# Patient Record
Sex: FEMALE | Race: WHITE | NOT HISPANIC OR LATINO | Employment: UNEMPLOYED | RURAL
[De-identification: names, ages, dates, MRNs, and addresses within clinical notes are randomized per-mention and may not be internally consistent; named-entity substitution may affect disease eponyms.]

---

## 2021-04-13 PROBLEM — M48.02 CERVICAL STENOSIS OF SPINE: Status: ACTIVE | Noted: 2020-11-11

## 2022-06-14 PROBLEM — E66.812 CLASS 2 OBESITY DUE TO EXCESS CALORIES WITHOUT SERIOUS COMORBIDITY WITH BODY MASS INDEX (BMI) OF 37.0 TO 37.9 IN ADULT: Status: ACTIVE | Noted: 2022-06-14

## 2022-12-23 ENCOUNTER — TELEPHONE (OUTPATIENT)
Dept: FAMILY MEDICINE CLINIC | Facility: CLINIC | Age: 37
End: 2022-12-23

## 2022-12-23 NOTE — TELEPHONE ENCOUNTER
Pain Management was denied, note is in the comments of the referral. Please advice on what to do next.

## 2023-03-29 ENCOUNTER — TELEPHONE (OUTPATIENT)
Dept: FAMILY MEDICINE CLINIC | Facility: CLINIC | Age: 38
End: 2023-03-29

## 2023-03-29 NOTE — TELEPHONE ENCOUNTER
Caller: Carie Judge    Relationship: Self    Best call back number: [phone]    What specialty or service is being requested: PAIN MANAGEMENT, PSYCHIATRY, AND SLEEP MEDICINE     Any additional details: PATIENT HAS NOT HEARD ANYTHING REGARDING THESE REFERRALS AND IS WONDERING IF THERE WAS AN ISSUE. PATIENT IS REQUESTING THE REFERRALS BE AUTHORIZED ASAP.    PLEASE CALL PATIENT WHEN REFERRALS ARE AUTHORIZED

## 2023-04-25 ENCOUNTER — TELEPHONE (OUTPATIENT)
Dept: FAMILY MEDICINE CLINIC | Facility: CLINIC | Age: 38
End: 2023-04-25

## 2023-04-25 NOTE — TELEPHONE ENCOUNTER
Caller: Carie Judge    Relationship: Self    Best call back number: [phone]    What is the best time to reach you: ANY    Who are you requesting to speak with (clinical staff, provider,  specific staff member): CLINICAL    Do you know the name of the person who called: PATIENT    What was the call regarding: PATIENT RETURNING CALL ABOUT REFERRALS.  PATIENT TOLD TO DIAL EXT 1029 BUT IVR NEVER GAVE HER THE OPTION TO DIAL AN EXTENSION.    Do you require a callback: YES

## 2023-06-01 PROBLEM — Z31.69 PRE-CONCEPTION COUNSELING: Status: ACTIVE | Noted: 2023-06-01

## 2023-06-02 PROBLEM — Z71.6 ENCOUNTER FOR TOBACCO USE CESSATION COUNSELING: Status: ACTIVE | Noted: 2023-06-02

## 2023-08-13 PROBLEM — F32.A DEPRESSION: Status: ACTIVE | Noted: 2023-08-13

## 2023-09-16 DIAGNOSIS — F39 MOOD DISORDER: ICD-10-CM

## 2023-09-16 DIAGNOSIS — F32.A DEPRESSION, UNSPECIFIED DEPRESSION TYPE: ICD-10-CM

## 2023-09-25 DIAGNOSIS — N39.3 SUI (STRESS URINARY INCONTINENCE, FEMALE): ICD-10-CM

## 2023-09-25 DIAGNOSIS — F39 MOOD DISORDER: ICD-10-CM

## 2023-09-25 DIAGNOSIS — F32.A DEPRESSION, UNSPECIFIED DEPRESSION TYPE: ICD-10-CM

## 2023-09-25 RX ORDER — OXYBUTYNIN CHLORIDE 5 MG/1
5 TABLET, EXTENDED RELEASE ORAL DAILY
Qty: 30 TABLET | Refills: 2 | Status: SHIPPED | OUTPATIENT
Start: 2023-09-25

## 2023-09-25 NOTE — TELEPHONE ENCOUNTER
Caller: Carie Troy    Relationship: Self    Best call back number:[phone]     Requested Prescriptions:   Requested Prescriptions     Pending Prescriptions Disp Refills    oxybutynin XL (DITROPAN-XL) 5 MG 24 hr tablet 30 tablet 2     Sig: Take 1 tablet by mouth Daily.        Pharmacy where request should be sent: [facility] [address] [place] [address] [address] - [phone]  - [phone]     Last office visit with prescribing clinician: 8/11/2023   Last telemedicine visit with prescribing clinician: Visit date not found   Next office visit with prescribing clinician: 10/10/2023     Additional details provided by patient: PATIENT HAS ENOUGH FOR THIS WEEK    Does the patient have less than a 3 day supply:  [] Yes  [x] No    Would you like a call back once the refill request has been completed: [] Yes [x] No    If the office needs to give you a call back, can they leave a voicemail: [] Yes [x] No    Viky Arias Rep   09/25/23 10:50 EDT

## 2023-10-05 NOTE — PROGRESS NOTES
Office Note          Name: Carie Troy    : 1985     MRN: [id number]     Chief Complaint  Annual Exam    Subjective     Carie Troy is a 37 y.o. female here for her annual wellness and physical exam.  The patient presents today for an annual wellness and physical examination.     Annual wellness  Overall, she feels healthy.   Libido is fair.   Not taking any birth control.   Still amiable to pregnancy.   Does not perform self-breast examinations.   Trying to eat healthily.   Does not exercise.   Smokes half of 1 pack of cigarettes daily.   Denies marijuana use or any other drugs.    She denies any difficulty swallowing.     OBGYN  She has not made an appointment for her PAP smear yet.   No concerns for discharge or nipple issues.     Depression  Well controlled on sertraline.  Needs a refill for sertraline.   She is happy with this medication.     Allergies  Well controlled with Zyrtec.   Needs a refill for Zyrtec.     Stress incontinence  Well controlled with Ditropan.  She needs a refill on Ditropan.     Gastrointestinal issue  Watery stool minutes after eating.  Has occasional stomach pain.   She has tried Tums and Pepcid.   Bowel movements intermittently diarrhea and solid.   Denies constipation.     Medications   No longer taking Atarax, Phenergan, or using Nasacort.  Has not been taking folic acid or prenatal vitamin.    She needs refills on her antihypertensive medication.     Preventative maintenance  She has never had a colonoscopy.   She has not had a mammogram or bone density scan.   Agreeable to tetanus and influenza vaccine today.   [facility] for annual eye care; she wears contact lenses.   Goes to [facility] for [facility] care.     The following portions of the patient's history were reviewed and   updated as appropriate: allergies, current medications, past family history, past medical history, past social history, past surgical history, and problem  "list.    Compared to one year ago, the patient feels [his / her / their] physical   health is [better / worse / same / same].    Compared to one year ago, the patient feels [his / her / their] mental   health is [better / worse / same / same].    Preventive Health/Lifestyle:     General Health: good  Nutrition: in general, a \"healthy\" diet    Activity level is never.   Exercises 0 per week.  Energy level is fair.  Sleep: poorly  Hours of Sleep: 9  Sleep Apnea or Snoring: Yes  Skin Self Exam: occasionally  Monthly Breast Self Exam: Performs monthly self breast exam  Libido: fair  Contraceptive History: Patient reports  is wanting to get pregnant.  They are not using birth control.     Last Physical Exam: 3+ years ago      Last tetanus shot was >10 years ago. Patient is doing routine self breast exams: occasionally.    Pap Smear: Findings on last pap: approximate date 2+ years and was normal    Mammogram:  she has never had a mammogram    Bone Dexa scan: None    Colonoscopy: None     Recent Hospitalizations:  No hospitalization(s) within the last year..  [facility]     I personally reviewed and updated the patient's allergies, medications, problem list, and past medical, surgical, social, and family history.       Current Outpatient Medications:     folic acid (FOLVITE) 1 MG tablet, Take 1 tablet by mouth Daily., Disp: 90 tablet, Rfl: 3    ibuprofen (ADVIL,MOTRIN) 800 MG tablet, Take  by mouth See Admin Instructions. Take 1 tablet by mouth every 6-8 hours as needed, Disp: , Rfl:     oxybutynin XL (DITROPAN-XL) 5 MG 24 hr tablet, Take 1 tablet by mouth Daily., Disp: 30 tablet, Rfl: 11    sertraline (ZOLOFT) 50 MG tablet, Take 1 tablet by mouth Daily., Disp: 30 tablet, Rfl: 11    cetirizine (zyrTEC) 10 MG tablet, Take 1 tablet by mouth Daily., Disp: 30 tablet, Rfl: 12    PRENATAL GUMMY VITAMIN, Chew 1 each Daily., Disp: 30 each, Rfl: 11      No Known Allergies    There is no immunization history for the selected " administration types on file for this patient.    Review of Systems   Constitutional:  Negative for chills and fatigue.   HENT: Negative.     Eyes: Negative.    Respiratory:  Negative for cough, shortness of breath and wheezing.    Cardiovascular:  Negative for chest pain, palpitations and leg swelling.   Gastrointestinal:  Negative for abdominal distention, abdominal pain, anal bleeding, blood in stool, constipation, diarrhea, nausea, vomiting, GERD and indigestion.   Genitourinary:  Negative for amenorrhea, breast discharge, breast lump, breast pain, decreased libido, decreased urine volume, difficulty urinating, dysuria, flank pain, genital sores, menstrual problem, pelvic pain, urinary incontinence, vaginal bleeding, vaginal discharge and vaginal pain.   Musculoskeletal:  Negative for back pain.   Skin: Negative.    Allergic/Immunologic: Positive for environmental allergies and food allergies.   Neurological:  Negative for dizziness, headache and confusion.   Hematological:  Does not bruise/bleed easily.   Psychiatric/Behavioral:  Positive for depressed mood (controlled). Negative for decreased concentration, self-injury, sleep disturbance, suicidal ideas and stress. The patient is not nervous/anxious.           Past Medical History:   Diagnosis Date    Allergic        Past Surgical History:   Procedure Laterality Date     SECTION      x4 , , , 2019    DILATION AND CURETTAGE, DIAGNOSTIC / THERAPEUTIC         Family History   Adopted: Yes       Social History     Socioeconomic History    Marital status:    Tobacco Use    Smoking status: Every Day     Packs/day: 0.50     Years: 18.00     Additional pack years: 0.00     Total pack years: 9.00     Types: Cigarettes    Smokeless tobacco: Never   Vaping Use    Vaping Use: Some days    Substances: Nicotine   Substance and Sexual Activity    Alcohol use: Yes     Comment: for hoildays     Drug use: Not Currently     Types: Marijuana     "Sexual activity: Yes     Partners: Male     Comment:         The ASCVD Risk score (Kong ROSALES, et al., 2019) failed to calculate for the following reasons:    The 2019 ASCVD risk score is only valid for ages 40 to 79    PHQ-2 Depression Screening      3/9/2023     2:53 PM   PHQ-2/PHQ-9 Depression Screening   Little Interest or Pleasure in Doing Things 3-->nearly every day   Feeling Down, Depressed or Hopeless 1-->several days   Trouble Falling or Staying Asleep, or Sleeping Too Much 3-->nearly every day   Feeling Tired or Having Little Energy 3-->nearly every day   Poor Appetite or Overeating 2-->more than half the days   Feeling Bad about Yourself - or that You are a Failure or Have Let Yourself or Your Family Down 1-->several days   Trouble Concentrating on Things, Such as Reading the Newspaper or Watching Television 1-->several days   Moving or Speaking So Slowly that Other People Could Have Noticed? Or the Opposite - Being So Fidgety 2-->more than half the days   Thoughts that You Would be Better Off Dead or of Hurting Yourself in Some Way 0-->not at all   PHQ-9: Brief Depression Severity Measure Score 16   If You Checked Off Any Problems, How Difficult Have These Problems Made It For You to Do Your Work, Take Care of Things at Home, or Get Along with Other People? very difficult       Fall Risk Screen:  STEADI Fall Risk Assessment has not been completed.    Objective     Vital Signs:  /76 (BP Location: Right arm, Patient Position: Sitting, Cuff Size: Adult)   Pulse 70   Resp 18   Ht 154.9 cm (60.98\")   Wt 91.2 kg (201 lb)   SpO2 97%   BMI 38.00 kg/mý     BP Readings from Last 2 Encounters:   10/10/23 110/76   08/11/23 133/83       Wt Readings from Last 2 Encounters:   10/10/23 91.2 kg (201 lb)   08/11/23 90.6 kg (199 lb 12.8 oz)       Class 2 Severe Obesity (BMI >=35 and <=39.9). Obesity-related health conditions include the following: none. Obesity is unchanged. BMI is is above average; BMI " management plan is completed. We discussed low calorie, low carb based diet program, portion control, increasing exercise, and an darlene-based approach such as MyFitness Pal or Lose It.         Physical Exam:  Physical Exam  Vitals and nursing note reviewed.   Constitutional:       General: She is not in acute distress.     Appearance: Normal appearance. She is well-groomed and normal weight. She is not ill-appearing, toxic-appearing or diaphoretic.   HENT:      Head: Normocephalic.      Right Ear: Hearing, tympanic membrane, ear canal and external ear normal. There is no impacted cerumen.      Left Ear: Hearing, tympanic membrane, ear canal and external ear normal. There is no impacted cerumen.      Nose: Nose normal.      Right Sinus: No maxillary sinus tenderness or frontal sinus tenderness.      Left Sinus: No maxillary sinus tenderness or frontal sinus tenderness.      Mouth/Throat:      Lips: Pink.      Mouth: Mucous membranes are moist.      Pharynx: Oropharynx is clear. Uvula midline.   Eyes:      General: Lids are normal. Vision grossly intact.      Extraocular Movements: Extraocular movements intact.      Conjunctiva/sclera: Conjunctivae normal.      Pupils: Pupils are equal, round, and reactive to light.   Neck:      Thyroid: No thyroid mass, thyromegaly or thyroid tenderness.      Vascular: No carotid bruit.   Cardiovascular:      Rate and Rhythm: Normal rate and regular rhythm.      Pulses: Normal pulses.      Heart sounds: Normal heart sounds. No murmur heard.  Pulmonary:      Effort: Pulmonary effort is normal.      Breath sounds: Normal breath sounds and air entry.   Abdominal:      General: Abdomen is flat. Bowel sounds are normal.      Palpations: Abdomen is soft. Abdomen is not rigid.      Tenderness: There is no abdominal tenderness. There is no right CVA tenderness or left CVA tenderness.   Musculoskeletal:         General: Normal range of motion.      Cervical back: Full passive range of motion  without pain, normal range of motion and neck supple.      Thoracic back: No tenderness.      Right lower leg: No edema.      Left lower leg: No edema.   Skin:     General: Skin is warm and dry.      Capillary Refill: Capillary refill takes less than 2 seconds.      Comments: Excess sun exposure scattered freckles   Neurological:      General: No focal deficit present.      Mental Status: She is alert and oriented to person, place, and time. Mental status is at baseline.   Psychiatric:         Attention and Perception: Attention and perception normal.         Mood and Affect: Affect normal. Mood is depressed.         Speech: Speech normal.         Behavior: Behavior normal. Behavior is cooperative.         Thought Content: Thought content normal.       Derm Physical Exam    Results:   Result Review :       Labs:   CMP          10/29/2022    21:50   CMP   Glucose 114    BUN 8    Creatinine 0.65    EGFR 118.7    Sodium 139    Potassium 4.0    Chloride 101    Calcium 9.0    Total Protein 6.8    Albumin 3.90    Globulin 2.9    Total Bilirubin 0.2    Alkaline Phosphatase 75    AST (SGOT) 26    ALT (SGPT) 15    Albumin/Globulin Ratio 1.3    BUN/Creatinine Ratio 12.3    Anion Gap 13.0      CBC w/diff          10/29/2022    21:50   CBC w/Diff   WBC 5.60    RBC 4.31    Hemoglobin 12.8    Hematocrit 39.9    MCV 92.5    MCH 29.7    MCHC 32.1    RDW 12.9    Platelets 137    Neutrophil Rel % 54.1    Lymphocyte Rel % 30.4    Monocyte Rel % 13.9    Eosinophil Rel % 1.1    Basophil Rel % 0.5        TSH          10/29/2022    21:50   TSH   TSH 0.609      UA          10/29/2022    23:57 12/19/2022    16:10 6/1/2023    16:42   Urinalysis   Squamous Epithelial Cells, UA 3-6      Specific Gravity, UA 1.018      Ketones, UA Trace  Negative  Negative    Blood, UA Large (3+)      Leukocytes, UA Small (1+)  Negative  Negative    Nitrite, UA Negative      RBC, UA 21-30      WBC, UA 6-12      Bacteria, UA None Seen        Urine Culture           10/29/2022    23:57   Urine Culture   Urine Culture No growth        Imaging:   No valid procedures specified.        Data reviewed:          Assessment / Plan      Healthcare Maintenance:  Screenings for osteoporosis, breast cancer, lung cancer, colon cancer discussed.   Bone Dexa scan: None  Colonoscopy:   Last Completed Colonoscopy       This patient has no relevant Health Maintenance data.             Orders placed today for appropriate screenings.     Discussed injury prevention, diet and exercise, safe sexual practices, and screening for common diseases.   Encouraged use of sunscreen and seatbelts.   Discussed timing of  cervical cancer screening.   Encouraged monthly self-breast exams, yearly clinical breast exams, and discussed timing of mammograms.   Avoidance of tobacco encouraged.   Limitation or avoidance of alcohol encouraged.     Anticipatory guidance given and encouraged:  yearly dental and eye exams, monitoring of blood pressure, lipids, and screening for colon and lung cancer risks.      Recent lab results if available were discussed as well as medications reconciled.    All questions answered patient agrees with plan of care.    She agrees to return for next routine follow up or sooner as needed.     Assessment/Plan:   Problems Addressed this Visit          Active Problems    Mood disorder    Relevant Medications    sertraline (ZOLOFT) 50 MG tablet    Environmental allergies    Relevant Medications    cetirizine (zyrTEC) 10 MG tablet    Depression    Relevant Medications    sertraline (ZOLOFT) 50 MG tablet    Other Relevant Orders    POCT urinalysis dipstick, multipro    CBC & Differential    Comprehensive Metabolic Panel    LUANA (stress urinary incontinence, female)    Relevant Medications    oxybutynin XL (DITROPAN-XL) 5 MG 24 hr tablet     Other Visit Diagnoses       Encounter for well woman exam without gynecological exam    -  Primary    She will make an appointment for her PAP smear.  Urinalysis obtained. Annual laboratory work will be ordered. Blood pressure well controlled.    Irritable bowel syndrome with diarrhea        Instructed to document watery stools and food intake.    Need for Tdap vaccination        Tetanus vaccine today.    Relevant Orders    Tdap Vaccine Greater Than or Equal To 8yo IM    Flu vaccine need        Influenza vaccine today.    Relevant Orders    Fluzone >6 Months (4284-4024)    Screening for lipid disorders        Relevant Orders    Lipid Panel    Screening for thyroid disorder        Relevant Orders    TSH Rfx On Abnormal To Free T4    Encounter for hepatitis C screening test for low risk patient        Relevant Orders    Hepatitis C Antibody          Diagnoses         Codes Comments    Encounter for well woman exam without gynecological exam    -  Primary ICD-10-CM: Z00.00  ICD-9-CM: V70.0 She will make an appointment for her PAP smear. Urinalysis obtained. Annual laboratory work will be ordered. Blood pressure well controlled.    Mood disorder     ICD-10-CM: F39  ICD-9-CM: 296.90     Depression, unspecified depression type     ICD-10-CM: F32.A  ICD-9-CM: 311 Children with DCS.Her two youngest are with sister (adopted).One child in/out of mental faciliities.Oldest in foster care.Refill for sertraline will be sent.    LUANA (stress urinary incontinence, female)     ICD-10-CM: N39.3  ICD-9-CM: 625.6 Well controlled with Ditropan. Refill for Ditropan will be sent.    Irritable bowel syndrome with diarrhea     ICD-10-CM: K58.0  ICD-9-CM: 564.1 Instructed to document watery stools and food intake.    Need for Tdap vaccination     ICD-10-CM: Z23  ICD-9-CM: V06.1 Tetanus vaccine today.    Flu vaccine need     ICD-10-CM: Z23  ICD-9-CM: V04.81 Influenza vaccine today.    Screening for lipid disorders     ICD-10-CM: Z13.220  ICD-9-CM: V77.91     Screening for thyroid disorder     ICD-10-CM: Z13.29  ICD-9-CM: V77.0     Encounter for hepatitis C screening test for low risk patient      ICD-10-CM: Z11.59  ICD-9-CM: V73.89     Environmental allergies     ICD-10-CM: Z91.09  ICD-9-CM: V15.09 Well controlled with Zyrtec. Refill for Zyrtec will be sent.             Discussed injury prevention, diet and exercise, safe sexual practices, and screening for common diseases. Encouraged use of sunscreen and seatbelts. Discussed timing of  cervical cancer screening. Encouraged monthly self-breast exams, yearly clinical breast exams, and discussed timing of mammograms. Avoidance of tobacco encouraged. Limitation or avoidance of alcohol encouraged. Anticipatory guidance given and routine screenings recommended with recommendations of yearly dental and eye exams., monitoring of blood pressure, lipids, and screening for colon and lung cancer risks. .       Follow Up   Wrapup Tab  Return in about 1 year (around 10/10/2024) for Annual physical.    Patient Instructions   Continue current plan of care as discussed.   Take medication as ordered (if applicable).    Practice good sleep hygiene.  Eat a well balanced diet with fresh fruit and vegetables.    Drink at least 8 bottles of water or equivalent per day.     Limit sweetened beverages, sodas, juices.    Bake, boil, broil or grill your food, avoid eating fried foods.   Exercise at least 150 minutes per week.       Please have blood work completed.  Orders are at [facility].      Make appt with Dr. Babcock for well woman exam.      Patient was given instructions and counseling regarding her condition or for health maintenance advice. Please see specific information pulled into the AVS if appropriate.  Hand hygiene was performed during entrance to exam room and following assessment of patient. This document is intended for medical expert use only.     EMR Dragon/Transcription disclaimer:   Much of this encounter note is an electronic transcription/translation of spoken language to printed text. The electronic translation of spoken language may permit erroneous, or at  times, nonsensical words or phrases to be inadvertently transcribed.      SHAREE Ordaz, FNP-C  [address]  [facility]  [address] [place] [address] [address]  [facility] [address] [phone]      Transcribed from ambient dictation for SHAREE Ordaz by Susanne Clayton.  10/10/23   09:20 EDT    Patient or patient representative verbalized consent to the visit recording.  I have personally performed the services described in this document as transcribed by the above individual, and it is both accurate and complete.

## 2023-10-10 ENCOUNTER — OFFICE VISIT (OUTPATIENT)
Dept: FAMILY MEDICINE CLINIC | Facility: CLINIC | Age: 38
End: 2023-10-10
Payer: MEDICAID

## 2023-10-10 VITALS
HEART RATE: 70 BPM | BODY MASS INDEX: 37.95 KG/M2 | OXYGEN SATURATION: 97 % | SYSTOLIC BLOOD PRESSURE: 110 MMHG | DIASTOLIC BLOOD PRESSURE: 76 MMHG | WEIGHT: 201 LBS | HEIGHT: 61 IN | RESPIRATION RATE: 18 BRPM

## 2023-10-10 DIAGNOSIS — Z91.09 ENVIRONMENTAL ALLERGIES: ICD-10-CM

## 2023-10-10 DIAGNOSIS — Z11.59 ENCOUNTER FOR HEPATITIS C SCREENING TEST FOR LOW RISK PATIENT: ICD-10-CM

## 2023-10-10 DIAGNOSIS — Z00.00 ENCOUNTER FOR WELL WOMAN EXAM WITHOUT GYNECOLOGICAL EXAM: Primary | ICD-10-CM

## 2023-10-10 DIAGNOSIS — F39 MOOD DISORDER: ICD-10-CM

## 2023-10-10 DIAGNOSIS — Z23 FLU VACCINE NEED: ICD-10-CM

## 2023-10-10 DIAGNOSIS — Z13.220 SCREENING FOR LIPID DISORDERS: ICD-10-CM

## 2023-10-10 DIAGNOSIS — Z13.29 SCREENING FOR THYROID DISORDER: ICD-10-CM

## 2023-10-10 DIAGNOSIS — K58.0 IRRITABLE BOWEL SYNDROME WITH DIARRHEA: ICD-10-CM

## 2023-10-10 DIAGNOSIS — F32.A DEPRESSION, UNSPECIFIED DEPRESSION TYPE: ICD-10-CM

## 2023-10-10 DIAGNOSIS — R31.9 HEMATURIA, UNSPECIFIED TYPE: ICD-10-CM

## 2023-10-10 DIAGNOSIS — N39.3 SUI (STRESS URINARY INCONTINENCE, FEMALE): ICD-10-CM

## 2023-10-10 DIAGNOSIS — Z23 NEED FOR TDAP VACCINATION: ICD-10-CM

## 2023-10-10 LAB
BILIRUB BLD-MCNC: NEGATIVE MG/DL
CLARITY, POC: CLEAR
COLOR UR: YELLOW
GLUCOSE UR STRIP-MCNC: NEGATIVE MG/DL
KETONES UR QL: NEGATIVE
LEUKOCYTE EST, POC: NEGATIVE
NITRITE UR-MCNC: NEGATIVE MG/ML
PH UR: 6.5 [PH] (ref 5–8)
PROT UR STRIP-MCNC: NEGATIVE MG/DL
RBC # UR STRIP: ABNORMAL /UL
SP GR UR: 1.03 (ref 1–1.03)
UROBILINOGEN UR QL: NORMAL

## 2023-10-10 PROCEDURE — 1160F RVW MEDS BY RX/DR IN RCRD: CPT

## 2023-10-10 PROCEDURE — 2014F MENTAL STATUS ASSESS: CPT

## 2023-10-10 PROCEDURE — 3008F BODY MASS INDEX DOCD: CPT

## 2023-10-10 PROCEDURE — 99395 PREV VISIT EST AGE 18-39: CPT

## 2023-10-10 PROCEDURE — 99214 OFFICE O/P EST MOD 30 MIN: CPT

## 2023-10-10 PROCEDURE — 1159F MED LIST DOCD IN RCRD: CPT

## 2023-10-10 RX ORDER — CETIRIZINE HYDROCHLORIDE 10 MG/1
10 TABLET ORAL DAILY
Qty: 30 TABLET | Refills: 12 | Status: SHIPPED | OUTPATIENT
Start: 2023-10-10

## 2023-10-10 RX ORDER — CYCLOBENZAPRINE HCL 10 MG
TABLET ORAL
COMMUNITY
Start: 2023-09-03 | End: 2023-10-10

## 2023-10-10 RX ORDER — OXYBUTYNIN CHLORIDE 5 MG/1
5 TABLET, EXTENDED RELEASE ORAL DAILY
Qty: 30 TABLET | Refills: 11 | Status: SHIPPED | OUTPATIENT
Start: 2023-10-10

## 2023-10-10 NOTE — PATIENT INSTRUCTIONS
Continue current plan of care as discussed.   Take medication as ordered (if applicable).    Practice good sleep hygiene.  Eat a well balanced diet with fresh fruit and vegetables.    Drink at least 8 bottles of water or equivalent per day.     Limit sweetened beverages, sodas, juices.    Bake, boil, broil or grill your food, avoid eating fried foods.   Exercise at least 150 minutes per week.       Please have blood work completed.  Orders are at [facility].      Make appt with Dr. Babcock for well woman exam.

## 2023-10-12 ENCOUNTER — TELEPHONE (OUTPATIENT)
Dept: FAMILY MEDICINE CLINIC | Facility: CLINIC | Age: 38
End: 2023-10-12
Payer: MEDICAID

## 2023-10-12 LAB
BACTERIA UR CULT: NORMAL
BACTERIA UR CULT: NORMAL

## 2023-10-12 NOTE — TELEPHONE ENCOUNTER
Caller: Carie Troy    Relationship: Self    Best call back number:     Carie Troy (Self) [phone] (Mobile)       What was the call regarding: WANTED TO KNOW IF SHAREE MENDOZA WAS GOING TO PUT HER ON MEDS TO HELP GET PREGNANT OR WAS SHE SUPPOSED TO BE SEEING AN OBGYN ?     Is it okay if the provider responds through MyChart: CALL PLEASE

## 2023-10-17 NOTE — PROGRESS NOTES
Office Note     Name: Carie Troy    : 1985     MRN: [id number]     Chief Complaint  Hypotension    Subjective     Carie Troy presents to [facility] for a Hospital follow up visit for hypotension. She states she was smoking weed, got up too fast and then states she passed out. She was taken to [facility] ED by her .     History of Present Illness  Carie was seen at [facility] emergency room for low blood pressure.  CT head brain, CT chest normal, CT abdomen pelvis is normal and did not indicate acute any acute abnormality that may be contributing to the episode.  EKG completed pulse 82 TX interval normal      Current Outpatient Medications:     cetirizine (zyrTEC) 10 MG tablet, Take 1 tablet by mouth Daily., Disp: 30 tablet, Rfl: 12    ciprofloxacin (CIPRO) 500 MG tablet, Take 1 tablet by mouth 2 (Two) Times a Day., Disp: , Rfl:     folic acid (FOLVITE) 1 MG tablet, Take 1 tablet by mouth Daily., Disp: 90 tablet, Rfl: 3    ibuprofen (ADVIL,MOTRIN) 800 MG tablet, Take  by mouth See Admin Instructions. Take 1 tablet by mouth every 6-8 hours as needed, Disp: , Rfl:     oxybutynin XL (DITROPAN-XL) 5 MG 24 hr tablet, Take 1 tablet by mouth Daily., Disp: 30 tablet, Rfl: 11    PRENATAL GUMMY VITAMIN, Chew 1 each Daily., Disp: 30 each, Rfl: 11    sertraline (ZOLOFT) 50 MG tablet, Take 1 tablet by mouth Daily., Disp: 30 tablet, Rfl: 11    albuterol sulfate  (90 Base) MCG/ACT inhaler, Inhale 2 puffs Every 4 (Four) Hours As Needed for Wheezing., Disp: 6.7 g, Rfl: 1    dicyclomine (BENTYL) 10 MG capsule, Take 1 capsule by mouth 4 (Four) Times a Day Before Meals & at Bedtime., Disp: 120 capsule, Rfl: 0    Fluticasone-Salmeterol (ADVAIR/WIXELA) 250-50 MCG/ACT DISKUS, Inhale 1 puff 2 (Two) Times a Day., Disp: 60 each, Rfl: 0    No Known Allergies    Past Surgical History:   Procedure Laterality Date     SECTION      x4 , , , 2019     "DILATION AND CURETTAGE, DIAGNOSTIC / THERAPEUTIC  2007        Family History   Adopted: Yes            3/9/2023     2:53 PM   PHQ-2/PHQ-9 Depression Screening   Little Interest or Pleasure in Doing Things 3-->nearly every day   Feeling Down, Depressed or Hopeless 1-->several days   Trouble Falling or Staying Asleep, or Sleeping Too Much 3-->nearly every day   Feeling Tired or Having Little Energy 3-->nearly every day   Poor Appetite or Overeating 2-->more than half the days   Feeling Bad about Yourself - or that You are a Failure or Have Let Yourself or Your Family Down 1-->several days   Trouble Concentrating on Things, Such as Reading the Newspaper or Watching Television 1-->several days   Moving or Speaking So Slowly that Other People Could Have Noticed? Or the Opposite - Being So Fidgety 2-->more than half the days   Thoughts that You Would be Better Off Dead or of Hurting Yourself in Some Way 0-->not at all   PHQ-9: Brief Depression Severity Measure Score 16   If You Checked Off Any Problems, How Difficult Have These Problems Made It For You to Do Your Work, Take Care of Things at Home, or Get Along with Other People? very difficult       Review of Systems   Constitutional:  Negative for activity change, appetite change, chills, fatigue and fever.   Eyes: Negative.    Respiratory:  Negative for cough, shortness of breath and wheezing.    Cardiovascular: Negative.    Gastrointestinal:  Positive for constipation, diarrhea and GERD.   Neurological:  Negative for dizziness, weakness, headache and confusion.   Psychiatric/Behavioral:  Negative for depressed mood. The patient is not nervous/anxious.        Objective     /80 (BP Location: Right arm, Patient Position: Sitting, Cuff Size: Adult)   Pulse 67   Resp 18   Ht 154.9 cm (60.98\")   Wt 92.5 kg (204 lb)   SpO2 97%   BMI 38.57 kg/m²     BP Readings from Last 2 Encounters:   10/19/23 120/80   10/10/23 110/76       Wt Readings from Last 2 Encounters: "   10/19/23 92.5 kg (204 lb)   10/10/23 91.2 kg (201 lb)                Physical Exam  Vitals and nursing note reviewed.   Constitutional:       General: She is not in acute distress.     Appearance: Normal appearance. She is well-groomed. She is not ill-appearing.   HENT:      Head: Normocephalic and atraumatic.   Eyes:      Extraocular Movements: Extraocular movements intact.      Pupils: Pupils are equal, round, and reactive to light.   Neck:      Vascular: No carotid bruit.   Cardiovascular:      Rate and Rhythm: Normal rate and regular rhythm.      Pulses: Normal pulses.      Heart sounds: Normal heart sounds. No murmur heard.  Pulmonary:      Effort: Pulmonary effort is normal.      Breath sounds: Normal breath sounds and air entry.   Musculoskeletal:      Right lower leg: No edema.      Left lower leg: No edema.   Skin:     General: Skin is warm and dry.      Capillary Refill: Capillary refill takes less than 2 seconds.   Neurological:      General: No focal deficit present.      Mental Status: She is alert and oriented to person, place, and time. Mental status is at baseline.   Psychiatric:         Attention and Perception: Attention normal.         Mood and Affect: Mood normal. Mood is not anxious or depressed.         Speech: Speech normal.         Behavior: Behavior normal. Behavior is cooperative.       Derm Physical Exam  Result Review  Labs  Result Review  Imaging  Med Tab  Media    Assessment and Plan         Problems Addressed this Visit          Active Problems    Chronic cough    Relevant Medications    albuterol sulfate  (90 Base) MCG/ACT inhaler    Fluticasone-Salmeterol (ADVAIR/WIXELA) 250-50 MCG/ACT DISKUS    Encounter for tobacco use cessation counseling    Irritable bowel syndrome with both constipation and diarrhea    Relevant Medications    dicyclomine (BENTYL) 10 MG capsule    Elevated random blood glucose level    Relevant Orders    POC Glycosylated Hemoglobin (Hb A1C)  "(Completed)    Hypotension - Primary     Other Visit Diagnoses       Abnormal urinalysis        Relevant Medications    ciprofloxacin (CIPRO) 500 MG tablet    Other Relevant Orders    POC Urinalysis Dipstick, Multipro    Urinalysis With Microscopic - Urine, Clean Catch    Follow-up exam        for hospital ed visit for syncopal episode.    Tobacco abuse        Marijuana abuse              Diagnoses         Codes Comments    Hypotension, unspecified hypotension type    -  Primary ICD-10-CM: I95.9  ICD-9-CM: 458.9 Normal blood pressure today.   120/80.    Elevated random blood glucose level     ICD-10-CM: R73.09  ICD-9-CM: 790.29     Abnormal urinalysis     ICD-10-CM: R82.90  ICD-9-CM: 791.9     Follow-up exam     ICD-10-CM: Z09  ICD-9-CM: V67.9 for hospital ed visit for syncopal episode.    Irritable bowel syndrome with both constipation and diarrhea     ICD-10-CM: K58.2  ICD-9-CM: 564.1     Tobacco abuse     ICD-10-CM: Z72.0  ICD-9-CM: 305.1     Marijuana abuse     ICD-10-CM: F12.10  ICD-9-CM: 305.20     Encounter for tobacco use cessation counseling     ICD-10-CM: Z71.6  ICD-9-CM: V65.42 smoking \"toomuch\" recently hospitalized for syncope episode.    Chronic cough     ICD-10-CM: R05.3  ICD-9-CM: 786.2            Procedures    Problem List Items Addressed This Visit          Active Problems    Chronic cough    Relevant Medications    albuterol sulfate  (90 Base) MCG/ACT inhaler    Fluticasone-Salmeterol (ADVAIR/WIXELA) 250-50 MCG/ACT DISKUS    Encounter for tobacco use cessation counseling    Overview     Encouraged tobacco cessation. Discuss patches, gums etc.            Irritable bowel syndrome with both constipation and diarrhea    Relevant Medications    dicyclomine (BENTYL) 10 MG capsule    Elevated random blood glucose level    Relevant Orders    POC Glycosylated Hemoglobin (Hb A1C) (Completed)    Hypotension - Primary    Overview     Normal blood pressure today.   120/80.          Other Visit Diagnoses "       Abnormal urinalysis        Relevant Medications    ciprofloxacin (CIPRO) 500 MG tablet    Other Relevant Orders    POC Urinalysis Dipstick, Multipro    Urinalysis With Microscopic - Urine, Clean Catch    Follow-up exam        for hospital ed visit for syncopal episode.    Tobacco abuse        Marijuana abuse                 {Instructions Charge Capture  Follow-up Communications     Wrapup Tab  Return in about 4 weeks (around 11/16/2023) for Recheck b/p, urine, ibs symptoms. .     Patient Instructions   Avoid all NSAIDS - No ibuprofen, motrin, advil, aleve, naproxyn, aspirin.    Increase your water intake!         Patient was given instructions and counseling regarding her condition or for health maintenance advice. Please see specific information pulled into the AVS if appropriate.  Hand hygiene was performed during entrance to exam room and following assessment of patient. This document is intended for medical expert use only.     EMR Dragon/Transcription disclaimer:   Much of this encounter note is an electronic transcription/translation of spoken language to printed text. The electronic translation of spoken language may permit erroneous, or at times, nonsensical words or phrases to be inadvertently transcribed.      SHAREE Ordaz, FNP-C  [facility] [address]  [facility]  [address] [place] [address] [address]  [place] [address] [phone]

## 2023-10-19 ENCOUNTER — OFFICE VISIT (OUTPATIENT)
Dept: FAMILY MEDICINE CLINIC | Facility: CLINIC | Age: 38
End: 2023-10-19
Payer: MEDICAID

## 2023-10-19 VITALS
HEIGHT: 61 IN | SYSTOLIC BLOOD PRESSURE: 120 MMHG | BODY MASS INDEX: 38.51 KG/M2 | HEART RATE: 67 BPM | RESPIRATION RATE: 18 BRPM | DIASTOLIC BLOOD PRESSURE: 80 MMHG | WEIGHT: 204 LBS | OXYGEN SATURATION: 97 %

## 2023-10-19 DIAGNOSIS — Z72.0 TOBACCO ABUSE: ICD-10-CM

## 2023-10-19 DIAGNOSIS — F12.10 MARIJUANA ABUSE: ICD-10-CM

## 2023-10-19 DIAGNOSIS — R05.3 CHRONIC COUGH: ICD-10-CM

## 2023-10-19 DIAGNOSIS — R73.09 ELEVATED RANDOM BLOOD GLUCOSE LEVEL: ICD-10-CM

## 2023-10-19 DIAGNOSIS — K58.2 IRRITABLE BOWEL SYNDROME WITH BOTH CONSTIPATION AND DIARRHEA: ICD-10-CM

## 2023-10-19 DIAGNOSIS — Z09 FOLLOW-UP EXAM: ICD-10-CM

## 2023-10-19 DIAGNOSIS — R82.90 ABNORMAL URINALYSIS: ICD-10-CM

## 2023-10-19 DIAGNOSIS — I95.9 HYPOTENSION, UNSPECIFIED HYPOTENSION TYPE: Primary | ICD-10-CM

## 2023-10-19 DIAGNOSIS — Z71.6 ENCOUNTER FOR TOBACCO USE CESSATION COUNSELING: ICD-10-CM

## 2023-10-19 PROBLEM — Z78.9 FAMILY HISTORY UNKNOWN: Status: ACTIVE | Noted: 2023-10-19

## 2023-10-19 PROBLEM — R73.03 PREDIABETES: Status: ACTIVE | Noted: 2023-10-19

## 2023-10-19 PROBLEM — R73.9 ELEVATED RANDOM BLOOD GLUCOSE LEVEL: Status: ACTIVE | Noted: 2023-10-19

## 2023-10-19 LAB
EXPIRATION DATE: ABNORMAL
HBA1C MFR BLD: 5.8 % (ref 4.5–5.7)
Lab: ABNORMAL

## 2023-10-19 RX ORDER — DICYCLOMINE HYDROCHLORIDE 10 MG/1
10 CAPSULE ORAL
Qty: 120 CAPSULE | Refills: 0 | Status: SHIPPED | OUTPATIENT
Start: 2023-10-19

## 2023-10-19 RX ORDER — CIPROFLOXACIN 500 MG/1
500 TABLET, FILM COATED ORAL 2 TIMES DAILY
COMMUNITY
Start: 2023-10-13

## 2023-10-19 RX ORDER — FLUTICASONE PROPIONATE AND SALMETEROL 250; 50 UG/1; UG/1
1 POWDER RESPIRATORY (INHALATION)
Qty: 60 EACH | Refills: 0 | Status: SHIPPED | OUTPATIENT
Start: 2023-10-19

## 2023-10-19 RX ORDER — ALBUTEROL SULFATE 90 UG/1
2 AEROSOL, METERED RESPIRATORY (INHALATION) EVERY 4 HOURS PRN
Qty: 6.7 G | Refills: 1 | Status: SHIPPED | OUTPATIENT
Start: 2023-10-19

## 2023-10-19 NOTE — PATIENT INSTRUCTIONS
Avoid all NSAIDS - No ibuprofen, motrin, advil, aleve, naproxyn, aspirin.    Increase your water intake!

## 2023-10-20 LAB
APPEARANCE UR: CLEAR
BACTERIA #/AREA URNS HPF: NORMAL /[HPF]
BILIRUB UR QL STRIP: NEGATIVE
CASTS URNS QL MICRO: NORMAL /LPF
COLOR UR: YELLOW
EPI CELLS #/AREA URNS HPF: NORMAL /HPF (ref 0–10)
GLUCOSE UR QL STRIP: NEGATIVE
HGB UR QL STRIP: NEGATIVE
KETONES UR QL STRIP: NEGATIVE
LEUKOCYTE ESTERASE UR QL STRIP: NEGATIVE
MICRO URNS: NORMAL
MICRO URNS: NORMAL
NITRITE UR QL STRIP: NEGATIVE
PH UR STRIP: 7 [PH] (ref 5–7.5)
PROT UR QL STRIP: NEGATIVE
RBC #/AREA URNS HPF: NORMAL /HPF (ref 0–2)
SP GR UR STRIP: 1.01 (ref 1–1.03)
UROBILINOGEN UR STRIP-MCNC: 0.2 MG/DL (ref 0.2–1)
WBC #/AREA URNS HPF: NORMAL /HPF (ref 0–5)

## 2023-11-21 LAB
ALBUMIN SERPL-MCNC: 4.5 G/DL (ref 3.9–4.9)
ALBUMIN/GLOB SERPL: 1.6 {RATIO} (ref 1.2–2.2)
ALP SERPL-CCNC: 95 IU/L (ref 44–121)
ALT SERPL-CCNC: 16 IU/L (ref 0–32)
AST SERPL-CCNC: 18 IU/L (ref 0–40)
BASOPHILS # BLD AUTO: 0.1 X10E3/UL (ref 0–0.2)
BASOPHILS NFR BLD AUTO: 1 %
BILIRUB SERPL-MCNC: 0.2 MG/DL (ref 0–1.2)
BUN SERPL-MCNC: 14 MG/DL (ref 6–20)
BUN/CREAT SERPL: 22 (ref 9–23)
CALCIUM SERPL-MCNC: 9.3 MG/DL (ref 8.7–10.2)
CHLORIDE SERPL-SCNC: 105 MMOL/L (ref 96–106)
CHOLEST SERPL-MCNC: 177 MG/DL (ref 100–199)
CO2 SERPL-SCNC: 22 MMOL/L (ref 20–29)
CREAT SERPL-MCNC: 0.65 MG/DL (ref 0.57–1)
EGFRCR SERPLBLD CKD-EPI 2021: 118 ML/MIN/1.73
EOSINOPHIL # BLD AUTO: 0.3 X10E3/UL (ref 0–0.4)
EOSINOPHIL NFR BLD AUTO: 2 %
ERYTHROCYTE [DISTWIDTH] IN BLOOD BY AUTOMATED COUNT: 12.7 % (ref 11.7–15.4)
GLOBULIN SER CALC-MCNC: 2.9 G/DL (ref 1.5–4.5)
GLUCOSE SERPL-MCNC: 104 MG/DL (ref 70–99)
HCT VFR BLD AUTO: 40.1 % (ref 34–46.6)
HCV IGG SERPL QL IA: NON REACTIVE
HDLC SERPL-MCNC: 43 MG/DL
HGB BLD-MCNC: 13.1 G/DL (ref 11.1–15.9)
IMM GRANULOCYTES # BLD AUTO: 0 X10E3/UL (ref 0–0.1)
IMM GRANULOCYTES NFR BLD AUTO: 0 %
LDLC SERPL CALC-MCNC: 97 MG/DL (ref 0–99)
LYMPHOCYTES # BLD AUTO: 3.6 X10E3/UL (ref 0.7–3.1)
LYMPHOCYTES NFR BLD AUTO: 34 %
MCH RBC QN AUTO: 30.2 PG (ref 26.6–33)
MCHC RBC AUTO-ENTMCNC: 32.7 G/DL (ref 31.5–35.7)
MCV RBC AUTO: 92 FL (ref 79–97)
MONOCYTES # BLD AUTO: 0.8 X10E3/UL (ref 0.1–0.9)
MONOCYTES NFR BLD AUTO: 8 %
NEUTROPHILS # BLD AUTO: 5.6 X10E3/UL (ref 1.4–7)
NEUTROPHILS NFR BLD AUTO: 55 %
PLATELET # BLD AUTO: 207 X10E3/UL (ref 150–450)
POTASSIUM SERPL-SCNC: 4 MMOL/L (ref 3.5–5.2)
PROT SERPL-MCNC: 7.4 G/DL (ref 6–8.5)
RBC # BLD AUTO: 4.34 X10E6/UL (ref 3.77–5.28)
SODIUM SERPL-SCNC: 141 MMOL/L (ref 134–144)
TRIGL SERPL-MCNC: 218 MG/DL (ref 0–149)
TSH SERPL DL<=0.005 MIU/L-ACNC: 1.51 UIU/ML (ref 0.45–4.5)
VLDLC SERPL CALC-MCNC: 37 MG/DL (ref 5–40)
WBC # BLD AUTO: 10.4 X10E3/UL (ref 3.4–10.8)

## 2023-11-28 NOTE — PROGRESS NOTES
Chief Complaint  Follow-up (From ER visit at [facility] on 11/21/23 and follow up labs.)    Vianca Darnell is a 37 y.o. female  who presents to [facility]     Patient Care Team:  Carol Yu APRN as PCP - General (Nurse Practitioner)     History of Present Illness  Carie is here today for back pain and to follow up from ER visit at [facility] on 11/21/2023 for back pain    Location: Low back pain.  Pain radiates down both legs at times  Quality: sharp, dull, aching  Severity: severe pain  Duration: pain since 2012 (water slide accident); pain is worse over last 3 weeks  Timing: constant pain  Context: water slide accident in 2012  She went to the ER on 11/21/23  She has tried ice hot, biofreeze, aleve pain relief cream - helps a little  Alleviating factors: Norco; laying down helps some  Aggravating factors: sitting and standing  Associated Symptoms: no fever,  no incontinence    Seen at [facility] ER on 11/21/2023  She had labs (UA, urine Hcg)  She had a CT lumbar spine  She was prescribed Norco 5/325 mg every 8 hours for 3 days  Discharged home to follow up with PCP      CT lumbar spine 11/21/23 - No acute abnormality.  Degenerative changes as characterized above.  There is possible severe stenosis at L4-L5 and moderate to severe stenosis at L5-S1.  If further evaluation is warranted, please consider dedicated MRI of the lumbar spine.    She reports she went to [facility] in the past for back pain.    She also states she needs to go over lab results.  She had labs done on 11/20/23      Carie Troy  has a past medical history of Allergic, Chronic back pain, and Prediabetes (10/19/2023).      Review of Systems   Constitutional:  Negative for fever.   Gastrointestinal:  Negative for abdominal pain.   Genitourinary:         No incontinence   Musculoskeletal:  Positive for back pain.   Neurological:   Negative for weakness and numbness.        Family History   Adopted: Yes   Problem Relation Age of Onset    Hypertension Mother         Past Surgical History:   Procedure Laterality Date     SECTION  2012    Jeane     SECTION  10/12/2015    Kobe     SECTION  07/10/2018    Aries     SECTION  12/10/2019    Joel    DILATION AND CURETTAGE, DIAGNOSTIC / THERAPEUTIC          Social History     Socioeconomic History    Marital status:    Tobacco Use    Smoking status: Every Day     Packs/day: 0.50     Years: 18.00     Additional pack years: 0.00     Total pack years: 9.00     Types: Cigarettes    Smokeless tobacco: Never   Vaping Use    Vaping Use: Some days    Substances: Nicotine   Substance and Sexual Activity    Alcohol use: Yes     Comment: for hoildays     Drug use: Yes     Types: Marijuana    Sexual activity: Yes     Partners: Male     Comment:          Immunization History   Administered Date(s) Administered    DTP 1988, 1988, 1988, 1989, 1993    Hep B, Adolescent or Pediatric 1999, 1999, 1999    Hib (HbO) 1989    Influenza Seasonal Injectable 10/09/2013    MMR 03/10/1989, 1999    OPV 1988, 1988, 1989, 1993       Objective       Current Outpatient Medications:     albuterol sulfate  (90 Base) MCG/ACT inhaler, Inhale 2 puffs Every 4 (Four) Hours As Needed for Wheezing., Disp: 6.7 g, Rfl: 1    Fluticasone-Salmeterol (ADVAIR/WIXELA) 250-50 MCG/ACT DISKUS, Inhale 1 puff 2 (Two) Times a Day., Disp: 60 each, Rfl: 0    folic acid (FOLVITE) 1 MG tablet, Take 1 tablet by mouth Daily., Disp: 90 tablet, Rfl: 3    oxybutynin XL (DITROPAN-XL) 5 MG 24 hr tablet, Take 1 tablet by mouth Daily., Disp: 30 tablet, Rfl: 11    cetirizine (zyrTEC) 10 MG tablet, Take 1 tablet by mouth Daily., Disp: 30 tablet, Rfl: 12    dicyclomine (BENTYL) 10 MG capsule, Take 1 capsule by mouth 4  "(Four) Times a Day Before Meals & at Bedtime., Disp: 120 capsule, Rfl: 0    etodolac (LODINE) 400 MG tablet, Take 1 tablet by mouth Every 12 (Twelve) Hours As Needed (back pain)., Disp: 60 tablet, Rfl: 0    methylPREDNISolone (MEDROL) 4 MG dose pack, Take as directed on package instructions., Disp: 21 tablet, Rfl: 0    sertraline (ZOLOFT) 50 MG tablet, Take 1 tablet by mouth Daily., Disp: 30 tablet, Rfl: 11    Vital Signs:      /75 (BP Location: Right arm, Patient Position: Sitting, Cuff Size: Adult)   Pulse 72   Resp 18   Ht 154.9 cm (60.98\")   Wt 95.7 kg (211 lb)   SpO2 98%   BMI 39.89 kg/m²     Vitals:    11/29/23 1319   BP: 112/75   BP Location: Right arm   Patient Position: Sitting   Cuff Size: Adult   Pulse: 72   Resp: 18   SpO2: 98%   Weight: 95.7 kg (211 lb)   Height: 154.9 cm (60.98\")      Physical Exam  Vitals reviewed. Exam conducted with a chaperone present ().   Constitutional:       General: She is not in acute distress.     Appearance: Normal appearance. She is obese.   HENT:      Head: Normocephalic and atraumatic.      Right Ear: Tympanic membrane, ear canal and external ear normal.      Left Ear: Tympanic membrane, ear canal and external ear normal.      Mouth/Throat:      Mouth: Mucous membranes are moist.      Pharynx: Oropharynx is clear.   Eyes:      General: No scleral icterus.     Conjunctiva/sclera: Conjunctivae normal.   Cardiovascular:      Rate and Rhythm: Normal rate and regular rhythm.      Heart sounds: Normal heart sounds.   Pulmonary:      Effort: Pulmonary effort is normal. No respiratory distress.      Breath sounds: Normal breath sounds. No wheezing.   Abdominal:      General: Bowel sounds are normal.      Palpations: Abdomen is soft. There is no mass.      Tenderness: There is no abdominal tenderness. There is no guarding or rebound.   Musculoskeletal:      Cervical back: Neck supple.      Lumbar back: Spasms and tenderness present. No edema. Negative right " straight leg raise test and negative left straight leg raise test.      Right lower leg: No edema.      Left lower leg: No edema.      Comments: + tender over bilateral SI joints   Lymphadenopathy:      Cervical: No cervical adenopathy.   Skin:     General: Skin is warm and dry.   Neurological:      Mental Status: She is alert and oriented to person, place, and time.      Cranial Nerves: Cranial nerves 2-12 are intact.      Sensory: Sensation is intact.      Motor: Motor function is intact. No weakness.      Gait: Gait is intact.   Psychiatric:         Mood and Affect: Mood normal.        Result Review :   The following data was reviewed by: SHAREE Wiley on 11/29/2023:  Common labs          10/19/2023    15:35 11/20/2023    16:05   Common Labs   Glucose  104    BUN  14    Creatinine  0.65    Sodium  141    Potassium  4.0    Chloride  105    Calcium  9.3    Total Protein  7.4    Albumin  4.5    Total Bilirubin  0.2    Alkaline Phosphatase  95    AST (SGOT)  18    ALT (SGPT)  16    WBC  10.4    Hemoglobin  13.1    Hematocrit  40.1    Platelets  207    Total Cholesterol  177    Triglycerides  218    HDL Cholesterol  43    LDL Cholesterol   97    Hemoglobin A1C 5.8       Data reviewed : Radiologic studies CT lumbar spine w/o on 11/21/23 and Recent ER [facility] from [facility] on 11/21/2023          CT lumbar spine 11/21/23 - No acute abnormality.  Degenerative changes as characterized above.  There is possible severe stenosis at L4-L5 and moderate to severe stenosis at L5-S1.  If further evaluation is warranted, please consider dedicated MRI of the lumbar spine.       Assessment and Plan    Diagnoses and all orders for this visit:    1. Chronic bilateral low back pain with bilateral sciatica (Primary)  Assessment & Plan:  Ordered MRI lumbar spine  Begin etodolac 400 mg 1 tablet every 12 hours with food for pain.  Begin medrol dose pack.    Orders:  -     MRI Lumbar Spine Without Contrast; Future  -      etodolac (LODINE) 400 MG tablet; Take 1 tablet by mouth Every 12 (Twelve) Hours As Needed (back pain).  Dispense: 60 tablet; Refill: 0  -     methylPREDNISolone (MEDROL) 4 MG dose pack; Take as directed on package instructions.  Dispense: 21 tablet; Refill: 0    2. Prediabetes  Overview:  Hgba1c 5.8 % on 10/19/2023    Assessment & Plan:  Make lifestyle changes: low carbohydrate diet, no concentrated sweets, exercise and weight reduction.      3. High triglycerides  Assessment & Plan:  Work on exercising regularly, avoid consuming excess sugar and simple carbohydrates, avoid excess alcohol, choose healthier fats and lose weight.       4. Class 2 obesity due to excess calories without serious comorbidity with body mass index (BMI) of 39.0 to 39.9 in adult  Assessment & Plan:  Patient's (Body mass index is 39.89 kg/m².) indicates that they are obese (BMI >30) with health conditions that include none . Weight is worsening. BMI  is above average; BMI management plan is completed. We discussed portion control and increasing exercise.       5. Tobacco dependence due to cigarettes  Assessment & Plan:  Discussed smoking cessation.  She would like to quit but not quite ready.  Discussed negative effects of tobacco use on degenerative disc disease.             Follow Up   Return in about 4 weeks (around 12/27/2023) for Follow up on back pain with PCP.  Patient was given instructions and counseling regarding her condition or for health maintenance advice. Please see specific information pulled into the AVS if appropriate.    Patient Instructions   Call [facility] to schedule your MRI of lumbar spine

## 2023-11-29 ENCOUNTER — OFFICE VISIT (OUTPATIENT)
Dept: FAMILY MEDICINE CLINIC | Facility: CLINIC | Age: 38
End: 2023-11-29
Payer: MEDICAID

## 2023-11-29 VITALS
SYSTOLIC BLOOD PRESSURE: 112 MMHG | DIASTOLIC BLOOD PRESSURE: 75 MMHG | WEIGHT: 211 LBS | BODY MASS INDEX: 39.84 KG/M2 | RESPIRATION RATE: 18 BRPM | OXYGEN SATURATION: 98 % | HEART RATE: 72 BPM | HEIGHT: 61 IN

## 2023-11-29 DIAGNOSIS — M54.42 CHRONIC BILATERAL LOW BACK PAIN WITH BILATERAL SCIATICA: Primary | ICD-10-CM

## 2023-11-29 DIAGNOSIS — F32.A DEPRESSION, UNSPECIFIED DEPRESSION TYPE: ICD-10-CM

## 2023-11-29 DIAGNOSIS — F17.210 TOBACCO DEPENDENCE DUE TO CIGARETTES: ICD-10-CM

## 2023-11-29 DIAGNOSIS — Z91.09 ENVIRONMENTAL ALLERGIES: ICD-10-CM

## 2023-11-29 DIAGNOSIS — E66.09 CLASS 2 OBESITY DUE TO EXCESS CALORIES WITHOUT SERIOUS COMORBIDITY WITH BODY MASS INDEX (BMI) OF 39.0 TO 39.9 IN ADULT: ICD-10-CM

## 2023-11-29 DIAGNOSIS — G89.29 CHRONIC BILATERAL LOW BACK PAIN WITH BILATERAL SCIATICA: Primary | ICD-10-CM

## 2023-11-29 DIAGNOSIS — K58.2 IRRITABLE BOWEL SYNDROME WITH BOTH CONSTIPATION AND DIARRHEA: ICD-10-CM

## 2023-11-29 DIAGNOSIS — M54.41 CHRONIC BILATERAL LOW BACK PAIN WITH BILATERAL SCIATICA: Primary | ICD-10-CM

## 2023-11-29 DIAGNOSIS — R73.03 PREDIABETES: ICD-10-CM

## 2023-11-29 DIAGNOSIS — E78.1 HIGH TRIGLYCERIDES: ICD-10-CM

## 2023-11-29 DIAGNOSIS — F39 MOOD DISORDER: ICD-10-CM

## 2023-11-29 RX ORDER — CETIRIZINE HYDROCHLORIDE 10 MG/1
10 TABLET ORAL DAILY
Qty: 30 TABLET | Refills: 12 | Status: SHIPPED | OUTPATIENT
Start: 2023-11-29

## 2023-11-29 RX ORDER — DICYCLOMINE HYDROCHLORIDE 10 MG/1
10 CAPSULE ORAL
Qty: 120 CAPSULE | Refills: 0 | Status: SHIPPED | OUTPATIENT
Start: 2023-11-29

## 2023-11-29 RX ORDER — ETODOLAC 400 MG/1
400 TABLET, FILM COATED ORAL EVERY 12 HOURS PRN
Qty: 60 TABLET | Refills: 0 | Status: SHIPPED | OUTPATIENT
Start: 2023-11-29

## 2023-11-29 NOTE — TELEPHONE ENCOUNTER
Caller: Carie Troy    Relationship: Self    Best call back number: [phone]     Requested Prescriptions:   Requested Prescriptions     Pending Prescriptions Disp Refills    sertraline (ZOLOFT) 50 MG tablet 30 tablet 11     Sig: Take 1 tablet by mouth Daily.    cetirizine (zyrTEC) 10 MG tablet 30 tablet 12     Sig: Take 1 tablet by mouth Daily.    dicyclomine (BENTYL) 10 MG capsule 120 capsule 0     Sig: Take 1 capsule by mouth 4 (Four) Times a Day Before Meals & at Bedtime.        Pharmacy where request should be sent: [facility] [address] [place] [address] [address] - [phone]  - [phone] FX     Last office visit with prescribing clinician: 10/19/2023   Last telemedicine visit with prescribing clinician: Visit date not found   Next office visit with prescribing clinician: 1/12/2024     Additional details provided by patient:     Does the patient have less than a 3 day supply:  [] Yes  [] No    Would you like a call back once the refill request has been completed: [] Yes [] No    If the office needs to give you a call back, can they leave a voicemail: [] Yes [] No    April Viky Tang Rep   11/29/23 15:38 EST

## 2023-12-01 DIAGNOSIS — M48.061 SPINAL STENOSIS OF LUMBAR REGION AT MULTIPLE LEVELS: Primary | ICD-10-CM

## 2023-12-01 PROBLEM — F17.210 TOBACCO DEPENDENCE DUE TO CIGARETTES: Status: ACTIVE | Noted: 2022-06-14

## 2023-12-01 PROBLEM — E78.1 HIGH TRIGLYCERIDES: Status: ACTIVE | Noted: 2023-12-01

## 2023-12-01 PROBLEM — G89.29 CHRONIC BILATERAL LOW BACK PAIN WITH BILATERAL SCIATICA: Status: ACTIVE | Noted: 2023-12-01

## 2023-12-01 PROBLEM — Z31.9 PATIENT DESIRES PREGNANCY: Status: ACTIVE | Noted: 2023-06-01

## 2023-12-01 PROBLEM — M54.42 CHRONIC BILATERAL LOW BACK PAIN WITH BILATERAL SCIATICA: Status: ACTIVE | Noted: 2023-12-01

## 2023-12-01 PROBLEM — M54.41 CHRONIC BILATERAL LOW BACK PAIN WITH BILATERAL SCIATICA: Status: ACTIVE | Noted: 2023-12-01

## 2023-12-01 PROBLEM — F17.210 TOBACCO DEPENDENCE DUE TO CIGARETTES: Status: ACTIVE | Noted: 2023-12-01

## 2023-12-01 PROBLEM — M51.36 BULGING LUMBAR DISC: Status: ACTIVE | Noted: 2023-12-01

## 2023-12-01 PROBLEM — M51.369 BULGING LUMBAR DISC: Status: ACTIVE | Noted: 2023-12-01

## 2023-12-01 RX ORDER — METHYLPREDNISOLONE 4 MG/1
TABLET ORAL
Qty: 21 TABLET | Refills: 0 | Status: SHIPPED | OUTPATIENT
Start: 2023-12-01

## 2023-12-01 NOTE — ASSESSMENT & PLAN NOTE
Work on exercising regularly, avoid consuming excess sugar and simple carbohydrates, avoid excess alcohol, choose healthier fats and lose weight.

## 2023-12-01 NOTE — ASSESSMENT & PLAN NOTE
Patient's (Body mass index is 39.89 kg/m².) indicates that they are obese (BMI >30) with health conditions that include none . Weight is worsening. BMI  is above average; BMI management plan is completed. We discussed portion control and increasing exercise.

## 2023-12-01 NOTE — ASSESSMENT & PLAN NOTE
Make lifestyle changes: low carbohydrate diet, no concentrated sweets, exercise and weight reduction.

## 2023-12-01 NOTE — ASSESSMENT & PLAN NOTE
Discussed smoking cessation.  She would like to quit but not quite ready.  Discussed negative effects of tobacco use on degenerative disc disease.

## 2023-12-01 NOTE — ASSESSMENT & PLAN NOTE
Ordered MRI lumbar spine  Begin etodolac 400 mg 1 tablet every 12 hours with food for pain.  Begin medrol dose pack.

## 2023-12-04 DIAGNOSIS — M54.42 CHRONIC BILATERAL LOW BACK PAIN WITH BILATERAL SCIATICA: ICD-10-CM

## 2023-12-04 DIAGNOSIS — M54.41 CHRONIC BILATERAL LOW BACK PAIN WITH BILATERAL SCIATICA: ICD-10-CM

## 2023-12-04 DIAGNOSIS — G89.29 CHRONIC BILATERAL LOW BACK PAIN WITH BILATERAL SCIATICA: ICD-10-CM

## 2023-12-08 DIAGNOSIS — R05.3 CHRONIC COUGH: ICD-10-CM

## 2023-12-08 RX ORDER — FLUTICASONE PROPIONATE AND SALMETEROL 250; 50 UG/1; UG/1
1 POWDER RESPIRATORY (INHALATION) 2 TIMES DAILY
Qty: 60 EACH | Refills: 0 | Status: SHIPPED | OUTPATIENT
Start: 2023-12-08

## 2023-12-14 DIAGNOSIS — R05.3 CHRONIC COUGH: ICD-10-CM

## 2023-12-14 RX ORDER — ALBUTEROL SULFATE 90 UG/1
2 AEROSOL, METERED RESPIRATORY (INHALATION) EVERY 4 HOURS PRN
Qty: 9 G | Refills: 0 | Status: SHIPPED | OUTPATIENT
Start: 2023-12-14

## 2024-01-09 ENCOUNTER — TELEPHONE (OUTPATIENT)
Dept: FAMILY MEDICINE CLINIC | Facility: CLINIC | Age: 39
End: 2024-01-09
Payer: MEDICAID

## 2024-01-09 DIAGNOSIS — R05.3 CHRONIC COUGH: ICD-10-CM

## 2024-01-09 RX ORDER — ALBUTEROL SULFATE 90 UG/1
2 AEROSOL, METERED RESPIRATORY (INHALATION) EVERY 4 HOURS PRN
Qty: 9 G | Refills: 0 | Status: SHIPPED | OUTPATIENT
Start: 2024-01-09

## 2024-01-09 RX ORDER — FLUTICASONE PROPIONATE AND SALMETEROL 250; 50 UG/1; UG/1
1 POWDER RESPIRATORY (INHALATION) 2 TIMES DAILY
Qty: 60 EACH | Refills: 0 | Status: SHIPPED | OUTPATIENT
Start: 2024-01-09

## 2024-01-09 NOTE — TELEPHONE ENCOUNTER
Caller: Carie Troy    Relationship: Self    Best call back number: [phone]    Requested Prescriptions:   Requested Prescriptions     Pending Prescriptions Disp Refills    Fluticasone-Salmeterol (ADVAIR/WIXELA) 250-50 MCG/ACT DISKUS 60 each 0     Sig: Inhale 1 puff 2 (Two) Times a Day.    albuterol sulfate  (90 Base) MCG/ACT inhaler 9 g 0     Si puffs Every 4 (Four) Hours As Needed for Wheezing.        Pharmacy where request should be sent: [facility] [address] [place] [address] - [address] - [phone]  - [phone] FX     Last office visit with prescribing clinician: 10/19/2023   Last telemedicine visit with prescribing clinician: Visit date not found   Next office visit with prescribing clinician: 2024     Additional details provided by patient: PATIENT STATES SHE IS COMPLETELY OUT OF THESE INHALERS .     Does the patient have less than a 3 day supply:  [x] Yes  [] No    Would you like a call back once the refill request has been completed: [x] Yes [] No    If the office needs to give you a call back, can they leave a voicemail: [x] Yes [] No    Maggie Aguirre, PCT   24 13:37 EST

## 2024-01-09 NOTE — TELEPHONE ENCOUNTER
Caller: Carie Troy    Relationship to patient: Self    Best call back number: [phone]    Chief complaint: HEART BURN, CHEST PAIN    Patient directed to call 911 or go to their nearest emergency room.     Patient verbalized understanding: [x] Yes  [] No  If no, why?

## 2024-01-10 NOTE — PROGRESS NOTES
Office Note     Name: Carie Troy    : 1985     MRN: [id number]     Chief Complaint  Back Pain    Subjective     Carie Troy presents to [facility] for a follow up visit for  back pain.  She would like to go over her MRI from December     History of Present Illness  She was referred to pain management. She states she is not allowed to go to [facility] pain [facility] and [facility] pain management will not take her insurance. She would like narcotics, as they help better than anything.   Back Pain  This is a chronic problem. The current episode started more than 1 year ago. The problem occurs constantly. The pain is moderate. The pain is Worse during the day. The symptoms are aggravated by bending and standing. Stiffness is present All day. Pertinent negatives include no fever or weakness. She has tried ice, NSAIDs, muscle relaxant and heat for the symptoms.     Needs appt with pain mgmt -   Referred to  Pain but unable to be seen due to health insurance problems.   [facility] dismissed her due to too many missed visits.   She is here for referral to pain mgmt.     Elevated lymphocyte count -  Repeat labs ordered.        Current Outpatient Medications:   •  albuterol sulfate  (90 Base) MCG/ACT inhaler, Inhale 2 puffs Every 4 (Four) Hours As Needed for Wheezing., Disp: 9 g, Rfl: 0  •  cetirizine (zyrTEC) 10 MG tablet, Take 1 tablet by mouth Daily., Disp: 30 tablet, Rfl: 12  •  dicyclomine (BENTYL) 10 MG capsule, Take 1 capsule by mouth 4 (Four) Times a Day Before Meals & at Bedtime., Disp: 120 capsule, Rfl: 0  •  etodolac (LODINE) 400 MG tablet, Take 1 tablet by mouth Every 12 (Twelve) Hours As Needed (back pain)., Disp: 60 tablet, Rfl: 0  •  Fluticasone-Salmeterol (ADVAIR/WIXELA) 250-50 MCG/ACT DISKUS, Inhale 1 puff 2 (Two) Times a Day., Disp: 60 each, Rfl: 0  •  Omeprazole 20 MG tablet delayed-release, 400 mg., Disp: , Rfl:   •  oxybutynin XL  (DITROPAN-XL) 5 MG 24 hr tablet, Take 1 tablet by mouth Daily., Disp: 30 tablet, Rfl: 11  •  sertraline (ZOLOFT) 50 MG tablet, Take 1 tablet by mouth Daily., Disp: 30 tablet, Rfl: 11  •  folic acid (FOLVITE) 1 MG tablet, Take 1 tablet by mouth Daily. (Patient not taking: Reported on 2024), Disp: 90 tablet, Rfl: 3    No Known Allergies    Past Surgical History:   Procedure Laterality Date   •  SECTION  2012    [place]   •  SECTION  10/12/2015    [place]   •  SECTION  07/10/2018    [place]   •  SECTION  12/10/2019    [place]   • DILATION AND CURETTAGE, DIAGNOSTIC / THERAPEUTIC          Family History   Adopted: Yes   Problem Relation Age of Onset   • Hypertension Mother             3/9/2023     2:53 PM   PHQ-2/PHQ-9 Depression Screening   Little Interest or Pleasure in Doing Things 3-->nearly every day   Feeling Down, Depressed or Hopeless 1-->several days   Trouble Falling or Staying Asleep, or Sleeping Too Much 3-->nearly every day   Feeling Tired or Having Little Energy 3-->nearly every day   Poor Appetite or Overeating 2-->more than half the days   Feeling Bad about Yourself - or that You are a Failure or Have Let Yourself or Your Family Down 1-->several days   Trouble Concentrating on Things, Such as Reading the Newspaper or Watching Television 1-->several days   Moving or Speaking So Slowly that Other People Could Have Noticed? Or the Opposite - Being So Fidgety 2-->more than half the days   Thoughts that You Would be Better Off Dead or of Hurting Yourself in Some Way 0-->not at all   PHQ-9: Brief Depression Severity Measure Score 16   If You Checked Off Any Problems, How Difficult Have These Problems Made It For You to Do Your Work, Take Care of Things at Home, or Get Along with Other People? very difficult       Review of Systems   Constitutional:  Negative for activity change, appetite change, chills, fatigue and fever.   Eyes: Negative.    Respiratory:  Negative for  "cough, shortness of breath and wheezing.    Cardiovascular: Negative.    Musculoskeletal:  Positive for back pain.   Neurological:  Negative for dizziness, weakness, headache and confusion.   Psychiatric/Behavioral:  Negative for depressed mood. The patient is not nervous/anxious.        Objective     /82 (BP Location: Right arm, Patient Position: Sitting, Cuff Size: Adult)   Pulse 72   Temp 97.7 °F (36.5 °C) (Temporal)   Resp 18   Ht 154.9 cm (60.98\")   Wt 97.1 kg (214 lb)   SpO2 98%   BMI 40.46 kg/m²     BP Readings from Last 2 Encounters:   01/12/24 108/82   11/29/23 112/75       Wt Readings from Last 2 Encounters:   01/12/24 97.1 kg (214 lb)   11/29/23 95.7 kg (211 lb)   .         Physical Exam  Vitals and nursing note reviewed.   Constitutional:       General: She is not in acute distress.     Appearance: Normal appearance. She is well-groomed. She is not ill-appearing.   HENT:      Head: Normocephalic and atraumatic.   Eyes:      Extraocular Movements: Extraocular movements intact.      Pupils: Pupils are equal, round, and reactive to light.   Neck:      Vascular: No carotid bruit.   Cardiovascular:      Rate and Rhythm: Normal rate and regular rhythm.      Pulses: Normal pulses.      Heart sounds: Normal heart sounds. No murmur heard.  Pulmonary:      Effort: Pulmonary effort is normal.      Breath sounds: Normal breath sounds and air entry.   Musculoskeletal:      Right lower leg: No edema.      Left lower leg: No edema.   Skin:     General: Skin is warm and dry.      Capillary Refill: Capillary refill takes less than 2 seconds.   Neurological:      General: No focal deficit present.      Mental Status: She is alert and oriented to person, place, and time. Mental status is at baseline.   Psychiatric:         Attention and Perception: Attention normal.         Mood and Affect: Mood normal. Mood is not anxious or depressed.         Speech: Speech normal.         Behavior: Behavior normal. Behavior " is cooperative.       Derm Physical Exam  Result Review :{ Labs  Result Review  Imaging  Med Tab  Media   Assessment and Plan      {CC Problem List  Visit Diagnosis  ROS  Review (Popup)  Health Maintenance  Quality  BestPractice  Medications  SmartSets  SnapShot Encounters  Media   Problems Addressed this Visit       Chronic bilateral low back pain with bilateral sciatica - Primary    Relevant Medications    etodolac (LODINE) 400 MG tablet    Other Relevant Orders    Ambulatory Referral to Spine Surgery    Ambulatory Referral to Pain Management    Spinal stenosis of lumbar region at multiple levels    Relevant Medications    etodolac (LODINE) 400 MG tablet    Other Relevant Orders    Ambulatory Referral to Spine Surgery    Ambulatory Referral to Pain Management     Other Visit Diagnoses       Elevated lymphocyte count        Relevant Medications    etodolac (LODINE) 400 MG tablet    Other Relevant Orders    Comprehensive Metabolic Panel    CBC & Differential    Peripheral Blood Smear          Diagnoses         Codes Comments    Chronic bilateral low back pain with bilateral sciatica    -  Primary ICD-10-CM: M54.42, M54.41, G89.29  ICD-9-CM: 724.2, 724.3, 338.29     Spinal stenosis of lumbar region at multiple levels     ICD-10-CM: M48.061  ICD-9-CM: 724.02     Elevated lymphocyte count     ICD-10-CM: D72.820  ICD-9-CM: 288.61            Procedures    Problem List Items Addressed This Visit       Chronic bilateral low back pain with bilateral sciatica - Primary    Relevant Medications    etodolac (LODINE) 400 MG tablet    Other Relevant Orders    Ambulatory Referral to Spine Surgery    Ambulatory Referral to Pain Management    Spinal stenosis of lumbar region at multiple levels    Overview     CT 11/2023:   Noted with varying degrees at: L3-L4, L4-L5, L5-S1.            Relevant Medications    etodolac (LODINE) 400 MG tablet    Other Relevant Orders    Ambulatory Referral to Spine Surgery    Ambulatory  Referral to Pain Management     Other Visit Diagnoses       Elevated lymphocyte count        Relevant Medications    etodolac (LODINE) 400 MG tablet    Other Relevant Orders    Comprehensive Metabolic Panel    CBC & Differential    Peripheral Blood Smear             {Instructions Charge Capture  Follow-up Communications :    Wrapup Tab  Return in about 3 months (around 4/12/2024).     Patient Instructions   Advise if referrals not placed within two weeks.      Patient was given instructions and counseling regarding her condition or for health maintenance advice. Please see specific information pulled into the AVS if appropriate.  Hand hygiene was performed during entrance to exam room and following assessment of patient. This document is intended for medical expert use only.     EMR Dragon/Transcription disclaimer:   Much of this encounter note is an electronic transcription/translation of spoken language to printed text. The electronic translation of spoken language may permit erroneous, or at times, nonsensical words or phrases to be inadvertently transcribed.      SHAREE Ordaz, FNP-C  [address]  [facility]  [address] [place] [address] [address]  [facility] [address] [phone]

## 2024-01-12 ENCOUNTER — OFFICE VISIT (OUTPATIENT)
Dept: FAMILY MEDICINE CLINIC | Facility: CLINIC | Age: 39
End: 2024-01-12
Payer: MEDICAID

## 2024-01-12 VITALS
WEIGHT: 214 LBS | TEMPERATURE: 97.7 F | RESPIRATION RATE: 18 BRPM | BODY MASS INDEX: 40.4 KG/M2 | HEART RATE: 72 BPM | DIASTOLIC BLOOD PRESSURE: 82 MMHG | OXYGEN SATURATION: 98 % | HEIGHT: 61 IN | SYSTOLIC BLOOD PRESSURE: 108 MMHG

## 2024-01-12 DIAGNOSIS — M54.41 CHRONIC BILATERAL LOW BACK PAIN WITH BILATERAL SCIATICA: Primary | ICD-10-CM

## 2024-01-12 DIAGNOSIS — G89.29 CHRONIC BILATERAL LOW BACK PAIN WITH BILATERAL SCIATICA: Primary | ICD-10-CM

## 2024-01-12 DIAGNOSIS — D72.820 ELEVATED LYMPHOCYTE COUNT: ICD-10-CM

## 2024-01-12 DIAGNOSIS — M48.061 SPINAL STENOSIS OF LUMBAR REGION AT MULTIPLE LEVELS: ICD-10-CM

## 2024-01-12 DIAGNOSIS — M54.42 CHRONIC BILATERAL LOW BACK PAIN WITH BILATERAL SCIATICA: Primary | ICD-10-CM

## 2024-01-12 PROCEDURE — 99213 OFFICE O/P EST LOW 20 MIN: CPT

## 2024-01-12 RX ORDER — ETODOLAC 400 MG/1
400 TABLET, FILM COATED ORAL EVERY 12 HOURS PRN
Qty: 60 TABLET | Refills: 0 | Status: SHIPPED | OUTPATIENT
Start: 2024-01-12

## 2024-01-12 RX ORDER — NICOTINE POLACRILEX 4 MG/1
20 GUM, CHEWING ORAL
COMMUNITY
Start: 2024-01-09

## 2024-01-23 DIAGNOSIS — K58.2 IRRITABLE BOWEL SYNDROME WITH BOTH CONSTIPATION AND DIARRHEA: ICD-10-CM

## 2024-01-28 RX ORDER — DICYCLOMINE HYDROCHLORIDE 10 MG/1
10 CAPSULE ORAL
Qty: 120 CAPSULE | Refills: 0 | Status: SHIPPED | OUTPATIENT
Start: 2024-01-28

## 2024-02-06 ENCOUNTER — OFFICE VISIT (OUTPATIENT)
Dept: FAMILY MEDICINE CLINIC | Facility: CLINIC | Age: 39
End: 2024-02-06
Payer: MEDICAID

## 2024-02-06 VITALS — BODY MASS INDEX: 40.78 KG/M2 | WEIGHT: 216 LBS | HEIGHT: 61 IN | RESPIRATION RATE: 18 BRPM | OXYGEN SATURATION: 98 %

## 2024-02-06 DIAGNOSIS — K13.21 LEUKOPLAKIA OF ORAL CAVITY: ICD-10-CM

## 2024-02-06 DIAGNOSIS — J35.1 ENLARGED TONSILS: ICD-10-CM

## 2024-02-06 DIAGNOSIS — R31.9 HEMATURIA, UNSPECIFIED TYPE: ICD-10-CM

## 2024-02-06 DIAGNOSIS — F12.10 MARIJUANA ABUSE: ICD-10-CM

## 2024-02-06 DIAGNOSIS — I95.9 HYPOTENSIVE EPISODE: Primary | ICD-10-CM

## 2024-02-06 DIAGNOSIS — Z72.0 TOBACCO ABUSE: ICD-10-CM

## 2024-02-06 DIAGNOSIS — D72.820 ELEVATED LYMPHOCYTE COUNT: ICD-10-CM

## 2024-02-06 DIAGNOSIS — J06.9 UPPER RESPIRATORY TRACT INFECTION, UNSPECIFIED TYPE: ICD-10-CM

## 2024-02-06 DIAGNOSIS — R73.09 ELEVATED RANDOM BLOOD GLUCOSE LEVEL: ICD-10-CM

## 2024-02-06 LAB
BILIRUB BLD-MCNC: NEGATIVE MG/DL
CLARITY, POC: CLEAR
COLOR UR: YELLOW
EXPIRATION DATE: NORMAL
FLUAV AG UPPER RESP QL IA.RAPID: NOT DETECTED
FLUBV AG UPPER RESP QL IA.RAPID: NOT DETECTED
GLUCOSE UR STRIP-MCNC: NEGATIVE MG/DL
HBA1C MFR BLD: 5.4 % (ref 4.5–5.7)
INTERNAL CONTROL: NORMAL
INTERNAL CONTROL: NORMAL
KETONES UR QL: NEGATIVE
LEUKOCYTE EST, POC: NEGATIVE
Lab: NORMAL
NITRITE UR-MCNC: NEGATIVE MG/ML
PH UR: 7 [PH] (ref 5–8)
PROT UR STRIP-MCNC: NEGATIVE MG/DL
RBC # UR STRIP: ABNORMAL /UL
S PYO AG THROAT QL: NEGATIVE
SARS-COV-2 AG UPPER RESP QL IA.RAPID: NOT DETECTED
SP GR UR: 1.02 (ref 1–1.03)
UROBILINOGEN UR QL: NORMAL

## 2024-02-06 PROCEDURE — 87428 SARSCOV & INF VIR A&B AG IA: CPT

## 2024-02-06 PROCEDURE — 87880 STREP A ASSAY W/OPTIC: CPT

## 2024-02-06 PROCEDURE — 99214 OFFICE O/P EST MOD 30 MIN: CPT

## 2024-02-06 PROCEDURE — 83036 HEMOGLOBIN GLYCOSYLATED A1C: CPT

## 2024-02-06 PROCEDURE — 3044F HG A1C LEVEL LT 7.0%: CPT

## 2024-02-06 RX ORDER — AMOXICILLIN AND CLAVULANATE POTASSIUM 875; 125 MG/1; MG/1
1 TABLET, FILM COATED ORAL 2 TIMES DAILY
Qty: 20 TABLET | Refills: 0 | Status: SHIPPED | OUTPATIENT
Start: 2024-02-06

## 2024-02-08 LAB
ALBUMIN SERPL-MCNC: 4.2 G/DL (ref 3.9–4.9)
ALBUMIN/GLOB SERPL: 1.4 {RATIO} (ref 1.2–2.2)
ALP SERPL-CCNC: 94 IU/L (ref 44–121)
ALT SERPL-CCNC: 25 IU/L (ref 0–32)
APPEARANCE UR: CLEAR
AST SERPL-CCNC: 17 IU/L (ref 0–40)
BACTERIA #/AREA URNS HPF: NORMAL /[HPF]
BACTERIA UR CULT: NO GROWTH
BACTERIA UR CULT: NORMAL
BASOPHILS # BLD AUTO: 0.1 X10E3/UL (ref 0–0.2)
BASOPHILS NFR BLD AUTO: 1 %
BILIRUB SERPL-MCNC: 0.2 MG/DL (ref 0–1.2)
BILIRUB UR QL STRIP: NEGATIVE
BUN SERPL-MCNC: 10 MG/DL (ref 6–20)
BUN/CREAT SERPL: 13 (ref 9–23)
CALCIUM SERPL-MCNC: 9.4 MG/DL (ref 8.7–10.2)
CASTS URNS QL MICRO: NORMAL /LPF
CHLORIDE SERPL-SCNC: 103 MMOL/L (ref 96–106)
CO2 SERPL-SCNC: 26 MMOL/L (ref 20–29)
COLOR UR: YELLOW
CREAT SERPL-MCNC: 0.79 MG/DL (ref 0.57–1)
EGFRCR SERPLBLD CKD-EPI 2021: 99 ML/MIN/1.73
EOSINOPHIL # BLD AUTO: 0.5 X10E3/UL (ref 0–0.4)
EOSINOPHIL NFR BLD AUTO: 5 %
EPI CELLS #/AREA URNS HPF: NORMAL /HPF (ref 0–10)
ERYTHROCYTE [DISTWIDTH] IN BLOOD BY AUTOMATED COUNT: 11.7 % (ref 11.7–15.4)
GLOBULIN SER CALC-MCNC: 3.1 G/DL (ref 1.5–4.5)
GLUCOSE SERPL-MCNC: 89 MG/DL (ref 70–99)
GLUCOSE UR QL STRIP: NEGATIVE
HCT VFR BLD AUTO: 42 % (ref 34–46.6)
HGB BLD-MCNC: 14.1 G/DL (ref 11.1–15.9)
HGB UR QL STRIP: NEGATIVE
IMM GRANULOCYTES # BLD AUTO: 0 X10E3/UL (ref 0–0.1)
IMM GRANULOCYTES NFR BLD AUTO: 0 %
KETONES UR QL STRIP: NEGATIVE
LEUKOCYTE ESTERASE UR QL STRIP: NEGATIVE
LYMPHOCYTES # BLD AUTO: 3.4 X10E3/UL (ref 0.7–3.1)
LYMPHOCYTES NFR BLD AUTO: 36 %
MCH RBC QN AUTO: 30.2 PG (ref 26.6–33)
MCHC RBC AUTO-ENTMCNC: 33.6 G/DL (ref 31.5–35.7)
MCV RBC AUTO: 90 FL (ref 79–97)
MICRO URNS: NORMAL
MICRO URNS: NORMAL
MONOCYTES # BLD AUTO: 0.6 X10E3/UL (ref 0.1–0.9)
MONOCYTES NFR BLD AUTO: 7 %
NEUTROPHILS # BLD AUTO: 4.9 X10E3/UL (ref 1.4–7)
NEUTROPHILS NFR BLD AUTO: 51 %
NITRITE UR QL STRIP: NEGATIVE
PATH INTERP BLD-IMP: ABNORMAL
PATH REV BLD -IMP: ABNORMAL
PATHOLOGIST NAME: ABNORMAL
PH UR STRIP: 7 [PH] (ref 5–7.5)
PLATELET # BLD AUTO: 227 X10E3/UL (ref 150–450)
POTASSIUM SERPL-SCNC: 4.4 MMOL/L (ref 3.5–5.2)
PROT SERPL-MCNC: 7.3 G/DL (ref 6–8.5)
PROT UR QL STRIP: NEGATIVE
RBC # BLD AUTO: 4.67 X10E6/UL (ref 3.77–5.28)
RBC #/AREA URNS HPF: NORMAL /HPF (ref 0–2)
SODIUM SERPL-SCNC: 141 MMOL/L (ref 134–144)
SP GR UR STRIP: 1.01 (ref 1–1.03)
UROBILINOGEN UR STRIP-MCNC: 0.2 MG/DL (ref 0.2–1)
WBC # BLD AUTO: 9.5 X10E3/UL (ref 3.4–10.8)
WBC #/AREA URNS HPF: NORMAL /HPF (ref 0–5)

## 2024-02-12 ENCOUNTER — PREP FOR SURGERY (OUTPATIENT)
Dept: OTHER | Facility: HOSPITAL | Age: 39
End: 2024-02-12
Payer: MEDICAID

## 2024-02-12 ENCOUNTER — OFFICE VISIT (OUTPATIENT)
Dept: NEUROSURGERY | Facility: CLINIC | Age: 39
End: 2024-02-12
Payer: MEDICAID

## 2024-02-12 VITALS
WEIGHT: 218 LBS | DIASTOLIC BLOOD PRESSURE: 83 MMHG | HEIGHT: 61 IN | SYSTOLIC BLOOD PRESSURE: 131 MMHG | BODY MASS INDEX: 41.16 KG/M2 | HEART RATE: 68 BPM

## 2024-02-12 DIAGNOSIS — M54.16 LUMBAR RADICULOPATHY: Primary | ICD-10-CM

## 2024-02-12 DIAGNOSIS — M54.16 LUMBAR RADICULOPATHY: ICD-10-CM

## 2024-02-12 DIAGNOSIS — M51.26 LUMBAR DISC HERNIATION: ICD-10-CM

## 2024-02-12 DIAGNOSIS — M48.062 LUMBAR STENOSIS WITH NEUROGENIC CLAUDICATION: Primary | ICD-10-CM

## 2024-02-12 DIAGNOSIS — M51.36 LUMBAR DEGENERATIVE DISC DISEASE: ICD-10-CM

## 2024-02-12 PROCEDURE — 1159F MED LIST DOCD IN RCRD: CPT | Performed by: NEUROLOGICAL SURGERY

## 2024-02-12 PROCEDURE — 1160F RVW MEDS BY RX/DR IN RCRD: CPT | Performed by: NEUROLOGICAL SURGERY

## 2024-02-12 PROCEDURE — 99204 OFFICE O/P NEW MOD 45 MIN: CPT | Performed by: NEUROLOGICAL SURGERY

## 2024-02-14 ENCOUNTER — TELEPHONE (OUTPATIENT)
Dept: NEUROSURGERY | Facility: CLINIC | Age: 39
End: 2024-02-14
Payer: MEDICAID

## 2024-02-14 ENCOUNTER — PATIENT ROUNDING (BHMG ONLY) (OUTPATIENT)
Dept: NEUROSURGERY | Facility: CLINIC | Age: 39
End: 2024-02-14
Payer: MEDICAID

## 2024-02-15 PROBLEM — M54.16 LUMBAR RADICULOPATHY: Status: ACTIVE | Noted: 2024-02-12

## 2024-02-21 ENCOUNTER — TELEPHONE (OUTPATIENT)
Dept: NEUROSURGERY | Facility: OTHER | Age: 39
End: 2024-02-21
Payer: MEDICAID

## 2024-02-21 NOTE — TELEPHONE ENCOUNTER
Caller: Carie Troy    Relationship: Self    Best call back number: [phone] (home)     What is the best time to reach you: ANYTIME    Who are you requesting to speak with (clinical staff, provider,  specific staff member): PARAG    What was the call regarding: PATIENT CALLED REGARDING SOME SURGERY INSTRUCTIONS - SPECIFICALLY SOME MEDICATIONS SHE TAKES EVERYDAY-     PLEASE ADVISE- THANK YOU!

## 2024-02-22 ENCOUNTER — TELEPHONE (OUTPATIENT)
Dept: FAMILY MEDICINE CLINIC | Facility: CLINIC | Age: 39
End: 2024-02-22
Payer: MEDICAID

## 2024-02-22 NOTE — TELEPHONE ENCOUNTER
Caller: Carie Troy    Relationship: Self    Best call back number: [phone]     What was the call regarding: RECENT REFERRAL WAS SENT TO [facility] WHICH IS VERY FAR FROM THE PATIENT.    SJHE WOULD LIKE THIS REFERRAL TO GO TO [facility] INSTEAD.

## 2024-02-22 NOTE — TELEPHONE ENCOUNTER
RETURNED CALL TO PATIENT DISCUSS MEDS TO HOLD FOR SURGERY, TOLD PATIENT OK TO TAKE ZOLOFT AM OF SURGERY AND TO HOLD ALL OTHER MORNING MEDS DAY OF SURGERY. PT VERBALLY UNDERSTOOD

## 2024-03-04 ENCOUNTER — TELEPHONE (OUTPATIENT)
Dept: NEUROSURGERY | Facility: CLINIC | Age: 39
End: 2024-03-04
Payer: MEDICAID

## 2024-03-04 DIAGNOSIS — K58.2 IRRITABLE BOWEL SYNDROME WITH BOTH CONSTIPATION AND DIARRHEA: ICD-10-CM

## 2024-03-04 RX ORDER — DICYCLOMINE HYDROCHLORIDE 10 MG/1
10 CAPSULE ORAL
Qty: 120 CAPSULE | Refills: 0 | Status: SHIPPED | OUTPATIENT
Start: 2024-03-04

## 2024-03-04 NOTE — TELEPHONE ENCOUNTER
Caller: Carie Troy    Relationship: Self    Best call back number: [phone]     Requested Prescriptions:   Requested Prescriptions     Pending Prescriptions Disp Refills    dicyclomine (BENTYL) 10 MG capsule 120 capsule 0     Sig: Take 1 capsule by mouth 4 (Four) Times a Day Before Meals & at Bedtime.        Pharmacy where request should be sent: [facility] [address] [place] [address] [address] - [phone]  - [phone]     Last office visit with prescribing clinician: 2/6/2024   Last telemedicine visit with prescribing clinician: Visit date not found   Next office visit with prescribing clinician: 3/29/2024     Additional details provided by patient:     Does the patient have less than a 3 day supply:  [x] Yes  [] No    Would you like a call back once the refill request has been completed: [] Yes [] No    If the office needs to give you a call back, can they leave a voicemail: [] Yes [] No    Viky Lugo Rep   03/04/24 11:05 EST

## 2024-03-04 NOTE — TELEPHONE ENCOUNTER
RETURNED CALL TO AND LM FOR IBIS LETTING HER THAT I DO NOT HAVE ANY TIME TO MOVE HER SURGERY UP AT THIS TIME BUT IF I DID GET A CANCELLATION I WILL CALL TO MOVE UP

## 2024-03-04 NOTE — TELEPHONE ENCOUNTER
Caller: Carie Troy    Relationship to patient: Self    Best call back number: [phone]    Type of visit: SURGERY    Requested date: ASAP     If rescheduling, when is the original appointment: CURRENTLY 4/5/24    Additional notes: PATIENT STATES SHE WENT TO ED YESTERDAY DUE TO PAIN AND WAS PRESCRIBED HYDROCODONE. SHE WOULD LIKE TO SEE IF SURGERY CAN BE MOVED UP. PLEASE RETURN HER CALL TO ADVISE.

## 2024-03-08 ENCOUNTER — TELEPHONE (OUTPATIENT)
Dept: NEUROSURGERY | Facility: CLINIC | Age: 39
End: 2024-03-08
Payer: MEDICAID

## 2024-03-08 RX ORDER — CEPHALEXIN 500 MG/1
500 CAPSULE ORAL 2 TIMES DAILY
Qty: 14 CAPSULE | Refills: 0 | Status: SHIPPED | OUTPATIENT
Start: 2024-03-08

## 2024-03-08 NOTE — TELEPHONE ENCOUNTER
Called the patient and gave the patient the message from Dr. Gutierrez. She was mainly concerned with getting anesthesia two different days. I told her she can call her PCP about that however we do spine surgery's quite frequently that are a two day procedure. She verbalized understanding.

## 2024-03-08 NOTE — TELEPHONE ENCOUNTER
PATIENT: IBIS BACA    PROVIDER: DR. MOJICA    REASON FOR CALL:     PATIENT CALLED WANTING TO KNOW IF THERE IS GOING TO BE AN ISSUE WITH HAVING ANOTHER DENTAL SURGERY ON APRIL 9TH, JUST A FEW DAYS AFTER HER SURGERY WITH DR. MOJICA. THE CONCERN IS ABOUT THE SEDATION AND IF THAT WILL BE SAFE. PLEASE CALL TO ADVISE THE PATIENT. THANKS    CALL BACK # [phone]    CALL BACK ANYTIME

## 2024-03-22 RX ORDER — DIPHENHYDRAMINE HCL 25 MG
25 CAPSULE ORAL EVERY 6 HOURS PRN
COMMUNITY

## 2024-03-26 ENCOUNTER — LAB (OUTPATIENT)
Dept: LAB | Facility: HOSPITAL | Age: 39
End: 2024-03-26
Payer: MEDICAID

## 2024-03-26 ENCOUNTER — HOSPITAL ENCOUNTER (OUTPATIENT)
Dept: CARDIOLOGY | Facility: HOSPITAL | Age: 39
Discharge: HOME | End: 2024-03-26
Payer: MEDICAID

## 2024-03-26 DIAGNOSIS — M54.16 LUMBAR RADICULOPATHY: ICD-10-CM

## 2024-03-26 LAB
ABO GROUP BLD: NORMAL
ANION GAP SERPL CALCULATED.3IONS-SCNC: 8.4 MMOL/L (ref 5–15)
BASOPHILS # BLD AUTO: 0.03 10*3/MM3 (ref 0–0.2)
BASOPHILS NFR BLD AUTO: 0.3 % (ref 0–1.5)
BILIRUB UR QL STRIP: NEGATIVE
BLD GP AB SCN SERPL QL: NEGATIVE
BUN SERPL-MCNC: 8 MG/DL (ref 6–20)
BUN/CREAT SERPL: 11.3 (ref 7–25)
CALCIUM SPEC-SCNC: 8.3 MG/DL (ref 8.6–10.5)
CHLORIDE SERPL-SCNC: 106 MMOL/L (ref 98–107)
CLARITY UR: CLEAR
CO2 SERPL-SCNC: 24.6 MMOL/L (ref 22–29)
COLOR UR: YELLOW
CREAT SERPL-MCNC: 0.71 MG/DL (ref 0.57–1)
DEPRECATED RDW RBC AUTO: 41.9 FL (ref 37–54)
EGFRCR SERPLBLD CKD-EPI 2021: 113.2 ML/MIN/1.73
EOSINOPHIL # BLD AUTO: 0.27 10*3/MM3 (ref 0–0.4)
EOSINOPHIL NFR BLD AUTO: 2.9 % (ref 0.3–6.2)
ERYTHROCYTE [DISTWIDTH] IN BLOOD BY AUTOMATED COUNT: 12.7 % (ref 12.3–15.4)
GLUCOSE SERPL-MCNC: 88 MG/DL (ref 65–99)
GLUCOSE UR STRIP-MCNC: NEGATIVE MG/DL
HBA1C MFR BLD: 5.7 % (ref 4.8–5.6)
HCT VFR BLD AUTO: 37.1 % (ref 34–46.6)
HGB BLD-MCNC: 12.1 G/DL (ref 12–15.9)
HGB UR QL STRIP.AUTO: NEGATIVE
IMM GRANULOCYTES # BLD AUTO: 0.03 10*3/MM3 (ref 0–0.05)
IMM GRANULOCYTES NFR BLD AUTO: 0.3 % (ref 0–0.5)
INR PPP: <0.93 (ref 0.93–1.1)
KETONES UR QL STRIP: NEGATIVE
LEUKOCYTE ESTERASE UR QL STRIP.AUTO: ABNORMAL
LYMPHOCYTES # BLD AUTO: 2.52 10*3/MM3 (ref 0.7–3.1)
LYMPHOCYTES NFR BLD AUTO: 27.2 % (ref 19.6–45.3)
MCH RBC QN AUTO: 30 PG (ref 26.6–33)
MCHC RBC AUTO-ENTMCNC: 32.6 G/DL (ref 31.5–35.7)
MCV RBC AUTO: 91.8 FL (ref 79–97)
MONOCYTES # BLD AUTO: 0.55 10*3/MM3 (ref 0.1–0.9)
MONOCYTES NFR BLD AUTO: 5.9 % (ref 5–12)
MRSA DNA SPEC QL NAA+PROBE: NORMAL
NEUTROPHILS NFR BLD AUTO: 5.88 10*3/MM3 (ref 1.7–7)
NEUTROPHILS NFR BLD AUTO: 63.4 % (ref 42.7–76)
NITRITE UR QL STRIP: NEGATIVE
NRBC BLD AUTO-RTO: 0 /100 WBC (ref 0–0.2)
PH UR STRIP.AUTO: 8 [PH] (ref 5–8)
PLATELET # BLD AUTO: 171 10*3/MM3 (ref 140–450)
PMV BLD AUTO: 11.9 FL (ref 6–12)
POTASSIUM SERPL-SCNC: 4 MMOL/L (ref 3.5–5.2)
PROT UR QL STRIP: NEGATIVE
PROTHROMBIN TIME: 9.8 SECONDS (ref 9.6–11.7)
QT INTERVAL: 395 MS
QTC INTERVAL: 419 MS
RBC # BLD AUTO: 4.04 10*6/MM3 (ref 3.77–5.28)
RH BLD: POSITIVE
SODIUM SERPL-SCNC: 139 MMOL/L (ref 136–145)
SP GR UR STRIP: 1.01 (ref 1–1.03)
T&S EXPIRATION DATE: NORMAL
UROBILINOGEN UR QL STRIP: ABNORMAL
WBC NRBC COR # BLD AUTO: 9.28 10*3/MM3 (ref 3.4–10.8)

## 2024-03-26 PROCEDURE — 81003 URINALYSIS AUTO W/O SCOPE: CPT

## 2024-03-26 PROCEDURE — 86850 RBC ANTIBODY SCREEN: CPT | Performed by: NEUROLOGICAL SURGERY

## 2024-03-26 PROCEDURE — 86900 BLOOD TYPING SEROLOGIC ABO: CPT | Performed by: NEUROLOGICAL SURGERY

## 2024-03-26 PROCEDURE — 80048 BASIC METABOLIC PNL TOTAL CA: CPT

## 2024-03-26 PROCEDURE — 36415 COLL VENOUS BLD VENIPUNCTURE: CPT

## 2024-03-26 PROCEDURE — 85610 PROTHROMBIN TIME: CPT

## 2024-03-26 PROCEDURE — 93010 ELECTROCARDIOGRAM REPORT: CPT | Performed by: INTERNAL MEDICINE

## 2024-03-26 PROCEDURE — 86901 BLOOD TYPING SEROLOGIC RH(D): CPT | Performed by: NEUROLOGICAL SURGERY

## 2024-03-26 PROCEDURE — 85025 COMPLETE CBC W/AUTO DIFF WBC: CPT

## 2024-03-26 PROCEDURE — 86901 BLOOD TYPING SEROLOGIC RH(D): CPT

## 2024-03-26 PROCEDURE — 83036 HEMOGLOBIN GLYCOSYLATED A1C: CPT

## 2024-03-26 PROCEDURE — 86900 BLOOD TYPING SEROLOGIC ABO: CPT

## 2024-03-26 PROCEDURE — 87641 MR-STAPH DNA AMP PROBE: CPT

## 2024-03-26 PROCEDURE — 93005 ELECTROCARDIOGRAM TRACING: CPT | Performed by: NEUROLOGICAL SURGERY

## 2024-03-29 ENCOUNTER — TELEPHONE (OUTPATIENT)
Dept: NEUROSURGERY | Facility: CLINIC | Age: 39
End: 2024-03-29
Payer: MEDICAID

## 2024-03-29 ENCOUNTER — OFFICE VISIT (OUTPATIENT)
Dept: FAMILY MEDICINE CLINIC | Facility: CLINIC | Age: 39
End: 2024-03-29
Payer: MEDICAID

## 2024-03-29 VITALS
BODY MASS INDEX: 43.61 KG/M2 | OXYGEN SATURATION: 97 % | DIASTOLIC BLOOD PRESSURE: 84 MMHG | HEIGHT: 61 IN | SYSTOLIC BLOOD PRESSURE: 134 MMHG | TEMPERATURE: 97.7 F | HEART RATE: 72 BPM | WEIGHT: 231 LBS | RESPIRATION RATE: 16 BRPM

## 2024-03-29 DIAGNOSIS — R05.3 CHRONIC COUGH: ICD-10-CM

## 2024-03-29 DIAGNOSIS — Z09 FOLLOW-UP EXAM: Primary | ICD-10-CM

## 2024-03-29 DIAGNOSIS — J30.2 SEASONAL ALLERGIES: ICD-10-CM

## 2024-03-29 DIAGNOSIS — M54.16 LUMBAR RADICULOPATHY: ICD-10-CM

## 2024-03-29 RX ORDER — CYCLOBENZAPRINE HCL 10 MG
TABLET ORAL
COMMUNITY
Start: 2024-03-24 | End: 2024-03-29 | Stop reason: SDUPTHER

## 2024-03-29 RX ORDER — PREDNISONE 20 MG/1
TABLET ORAL
COMMUNITY
Start: 2024-03-24

## 2024-03-29 RX ORDER — CYCLOBENZAPRINE HCL 10 MG
10 TABLET ORAL 2 TIMES DAILY PRN
Qty: 30 TABLET | Refills: 0 | Status: SHIPPED | OUTPATIENT
Start: 2024-03-29

## 2024-03-29 NOTE — PROGRESS NOTES
Office Note     Name: Carie Troy    : 1985     MRN: [id number]     Chief Complaint  Hypotension    Subjective     History of Present Illness:  Carie Troy is a 38 y.o. female who presents today for follow up exam to hospital ED visits.      History of Present Illness    She was seen on March 3, 2024 for lower back pain.  Prior imaging of the CT was reviewed and patient was diagnosed with lumbar stenosis.  She was advised to follow up with primary care.    She was again seen on 3/8/2024 for chest pain and asthma exacerbation.  Workup included EKG, blood work, and CT.  Patient left AMA before CT was completed.  Given elevated white count provider suspected pneumonia so antibiotics were sent including Augmentin.    She was again seen on 2024 for abdominal pain and low back pain.  She was noted to have an elevated white count and CT abdomen pelvis noted no acute abnormality.  She was given prescription for cyclobenzaprine and prednisone.    She reports that she is going to have surgery by Dr. Gutierrez on 24  for L4-5 microdiskectomy. She is requesting pain medication.  Advised her to follow up with Dr. Gutierrez regarding request due to impending surgery.     Reports weight gain since last visit. She states LMP 3/12-3/17/24.     She is here today for a follow up to the visits and has an acute complaint of a frequent harsh cough.      Allergies   Allergen Reactions    Gabapentin Other (See Comments)     Makes very tired    Ibuprofen Rash    Tylenol [Acetaminophen] Rash         Current Outpatient Medications:     albuterol sulfate  (90 Base) MCG/ACT inhaler, Inhale 2 puffs Every 4 (Four) Hours As Needed for Wheezing., Disp: 9 g, Rfl: 0    cyclobenzaprine (FLEXERIL) 10 MG tablet, Take 1 tablet by mouth 2 (Two) Times a Day As Needed for Muscle Spasms., Disp: 30 tablet, Rfl: 0    dicyclomine (BENTYL) 10 MG capsule, Take 1 capsule by mouth 4 (Four) Times a Day Before Meals & at Bedtime.,  Disp: 120 capsule, Rfl: 0    diphenhydrAMINE (BENADRYL) 25 mg capsule, Take 1 capsule by mouth Every 6 (Six) Hours As Needed for Itching or Allergies., Disp: , Rfl:     etodolac (LODINE) 400 MG tablet, Take 1 tablet by mouth Every 12 (Twelve) Hours As Needed (back pain)., Disp: 60 tablet, Rfl: 0    Fluticasone-Salmeterol (ADVAIR/WIXELA) 250-50 MCG/ACT DISKUS, Inhale 1 puff 2 (Two) Times a Day., Disp: 60 each, Rfl: 0    Omeprazole 20 MG tablet delayed-release, Take 20 mg by mouth Daily., Disp: , Rfl:     oxybutynin XL (DITROPAN-XL) 5 MG 24 hr tablet, Take 1 tablet by mouth Daily., Disp: 30 tablet, Rfl: 11    predniSONE (DELTASONE) 20 MG tablet, TAKE 3 TABLETS BY MOUTH ONCE DAILY FOR 7 DAYS, Disp: , Rfl:     sertraline (ZOLOFT) 50 MG tablet, Take 1 tablet by mouth Daily., Disp: 30 tablet, Rfl: 11    Past Medical History:   Diagnosis Date    Allergic     Asthma     Chronic back pain     GERD (gastroesophageal reflux disease)     Hypotension     IBS (irritable bowel syndrome)     Prediabetes 10/19/2023       Review of Systems   Respiratory:  Positive for cough. Negative for shortness of breath and wheezing.    Cardiovascular:  Negative for chest pain, palpitations and leg swelling.   Gastrointestinal:  Positive for GERD.   Musculoskeletal:  Positive for arthralgias and back pain.       Social History     Socioeconomic History    Marital status:    Tobacco Use    Smoking status: Every Day     Current packs/day: 0.50     Average packs/day: 0.5 packs/day for 18.0 years (9.0 ttl pk-yrs)     Types: Cigarettes    Smokeless tobacco: Never   Vaping Use    Vaping status: Some Days    Substances: Nicotine   Substance and Sexual Activity    Alcohol use: Yes     Comment: for hoildays     Drug use: Yes     Types: Marijuana    Sexual activity: Yes     Partners: Male     Comment:         Family History   Adopted: Yes   Problem Relation Age of Onset    Hypertension Mother            3/29/2024     9:48 AM   PHQ-2/PHQ-9  "Depression Screening   Little Interest or Pleasure in Doing Things 0-->not at all   Feeling Down, Depressed or Hopeless 0-->not at all   PHQ-9: Brief Depression Severity Measure Score 0       Fall Risk Screen:  [facility] Fall Risk Assessment has not been completed.      Objective     /84 (BP Location: Left arm, Patient Position: Sitting, Cuff Size: Large Adult)   Pulse 72   Temp 97.7 °F (36.5 °C) (Infrared)   Resp 16   Ht 154.9 cm (61\")   Wt 105 kg (231 lb)   SpO2 97%   BMI 43.65 kg/m²     BP Readings from Last 2 Encounters:   03/29/24 134/84   02/12/24 131/83       Wt Readings from Last 2 Encounters:   03/29/24 105 kg (231 lb)   02/12/24 98.9 kg (218 lb)       Class 3 Severe Obesity (BMI >=40). Obesity-related health conditions include the following: dyslipidemias and GERD. Obesity is unchanged. BMI is is above average; BMI management plan is completed. We discussed portion control and increasing exercise.         Physical Exam  Vitals and nursing note reviewed.   Constitutional:       General: She is not in acute distress.     Appearance: Normal appearance. She is well-groomed. She is obese. She is not ill-appearing, toxic-appearing or diaphoretic.   HENT:      Head: Normocephalic and atraumatic.      Right Ear: Tympanic membrane, ear canal and external ear normal. There is no impacted cerumen.      Left Ear: Tympanic membrane, ear canal and external ear normal. There is no impacted cerumen.      Nose: Nose normal. No congestion or rhinorrhea.      Mouth/Throat:      Mouth: Mucous membranes are moist.      Pharynx: Uvula midline. No oropharyngeal exudate or posterior oropharyngeal erythema.   Eyes:      General: Lids are normal. Vision grossly intact.      Extraocular Movements: Extraocular movements intact.      Pupils: Pupils are equal, round, and reactive to light.   Neck:      Vascular: No carotid bruit.   Cardiovascular:      Rate and Rhythm: Normal rate and regular rhythm.      Heart sounds: Normal " heart sounds.   Pulmonary:      Effort: Pulmonary effort is normal.      Breath sounds: Normal breath sounds and air entry.   Abdominal:      General: Abdomen is flat and protuberant. Bowel sounds are normal. There is no distension.      Palpations: Abdomen is soft. There is no shifting dullness, fluid wave, hepatomegaly, splenomegaly, mass or pulsatile mass.      Tenderness: There is no abdominal tenderness. There is no guarding or rebound.      Hernia: No hernia is present.   Musculoskeletal:      Right lower leg: No edema.      Left lower leg: No edema.   Skin:     General: Skin is warm and dry.   Neurological:      Mental Status: She is alert and oriented to person, place, and time. Mental status is at baseline.   Psychiatric:         Mood and Affect: Mood normal.         Behavior: Behavior normal. Behavior is cooperative.       Derm Physical Exam  Result Review     The following data was reviewed by: SHAREE Ordaz on 03/29/2024:  Common labs          11/20/2023    16:05 2/6/2024    14:45 2/6/2024    14:57 3/26/2024    11:13   Common Labs   Glucose 104   89  88    BUN 14   10  8    Creatinine 0.65   0.79  0.71    Sodium 141   141  139    Potassium 4.0   4.4  4.0    Chloride 105   103  106    Calcium 9.3   9.4  8.3    Total Protein 7.4   7.3     Albumin 4.5   4.2     Total Bilirubin 0.2   0.2     Alkaline Phosphatase 95   94     AST (SGOT) 18   17     ALT (SGPT) 16   25     WBC 10.4   9.5     Comment  9.28    Hemoglobin 13.1   14.1  12.1    Hematocrit 40.1   42.0  37.1    Platelets 207   227  171    Total Cholesterol 177       Triglycerides 218       HDL Cholesterol 43       LDL Cholesterol  97       Hemoglobin A1C  5.4   5.70         Data reviewed : Recent hospitalization notes [facility].       Assessment and Plan     Procedures  Plan   Diagnoses and all orders for this visit:    1. Follow-up exam (Primary)  Comments:  To multiple hospital ER visits.    2. Lumbar  radiculopathy  Comments:  Follow-up with Dr. Gutierrez.   Surgery scheduled 4/5/24.  Orders:  -     cyclobenzaprine (FLEXERIL) 10 MG tablet; Take 1 tablet by mouth 2 (Two) Times a Day As Needed for Muscle Spasms.  Dispense: 30 tablet; Refill: 0    3. Chronic cough  Comments:  h/o asthma and is a current smoker. advised to decrease smoking and use inhalers daily.    4. Seasonal allergies  Comments:  encourage zyrtec or local honey daily.        Problem List Items Addressed This Visit          Allergies and Adverse Reactions    Seasonal allergies       Neuro    Lumbar radiculopathy    Relevant Medications    predniSONE (DELTASONE) 20 MG tablet    cyclobenzaprine (FLEXERIL) 10 MG tablet       Pulmonary and Pneumonias    Chronic cough     Other Visit Diagnoses       Follow-up exam    -  Primary    To multiple hospital ER visits.             Follow Up   Wrapup Tab  Return for Recheck follow up after surgery. .   There are no Patient Instructions on file for this visit.   Patient was given instructions and counseling regarding her condition or for health maintenance advice. Please see specific information pulled into the AVS if appropriate.  Hand hygiene was performed during entrance to exam room and following assessment of patient. This document is intended for medical expert use only.     EMR Dragon/Transcription disclaimer:   Much of this encounter note is an electronic transcription/translation of spoken language to printed text. The electronic translation of spoken language may permit erroneous, or at times, nonsensical words or phrases to be inadvertently transcribed.      SHAREE Ordaz, FNP-C  [address]  [facility]  [address] [place] [address] DR CINDY [address]VAREZ  [facility] [address] [phone]

## 2024-03-29 NOTE — TELEPHONE ENCOUNTER
Patient called stating she is in severe pain and would like hydrocodone. I informed her we dont normally give pain medication unless you have had sx and it is normally a short period of time that that happens. She stated would he possibly take it out of what he is going to give her after her sx. I stated that we dont do that. But I would send a message. Patient stated she understood.

## 2024-04-04 ENCOUNTER — ANESTHESIA EVENT (OUTPATIENT)
Dept: PERIOP | Facility: HOSPITAL | Age: 39
End: 2024-04-04
Payer: MEDICAID

## 2024-04-04 RX ORDER — OXYCODONE HCL 10 MG/1
10 TABLET, FILM COATED, EXTENDED RELEASE ORAL ONCE
Status: COMPLETED | OUTPATIENT
Start: 2024-04-04 | End: 2024-04-05

## 2024-04-05 ENCOUNTER — HOSPITAL ENCOUNTER (OUTPATIENT)
Facility: HOSPITAL | Age: 39
Setting detail: HOSPITAL OUTPATIENT SURGERY
Discharge: HOME | End: 2024-04-05
Attending: NEUROLOGICAL SURGERY | Admitting: NEUROLOGICAL SURGERY
Payer: MEDICAID

## 2024-04-05 ENCOUNTER — ANESTHESIA (OUTPATIENT)
Dept: PERIOP | Facility: HOSPITAL | Age: 39
End: 2024-04-05
Payer: MEDICAID

## 2024-04-05 ENCOUNTER — APPOINTMENT (OUTPATIENT)
Dept: GENERAL RADIOLOGY | Facility: HOSPITAL | Age: 39
End: 2024-04-05
Payer: MEDICAID

## 2024-04-05 VITALS
OXYGEN SATURATION: 97 % | HEIGHT: 61 IN | RESPIRATION RATE: 13 BRPM | WEIGHT: 224.2 LBS | SYSTOLIC BLOOD PRESSURE: 137 MMHG | TEMPERATURE: 98.1 F | HEART RATE: 81 BPM | DIASTOLIC BLOOD PRESSURE: 93 MMHG | BODY MASS INDEX: 42.33 KG/M2

## 2024-04-05 DIAGNOSIS — M54.16 LUMBAR RADICULOPATHY: ICD-10-CM

## 2024-04-05 LAB — B-HCG UR QL: NEGATIVE

## 2024-04-05 PROCEDURE — 25010000002 MAGNESIUM SULFATE PER 500 MG OF MAGNESIUM

## 2024-04-05 PROCEDURE — 81025 URINE PREGNANCY TEST: CPT | Performed by: NEUROLOGICAL SURGERY

## 2024-04-05 PROCEDURE — 63030 LAMOT DCMPRN NRV RT 1 LMBR: CPT | Performed by: NEUROLOGICAL SURGERY

## 2024-04-05 PROCEDURE — 25010000002 SUCCINYLCHOLINE PER 20 MG

## 2024-04-05 PROCEDURE — 25010000002 CEFAZOLIN PER 500 MG: Performed by: NEUROLOGICAL SURGERY

## 2024-04-05 PROCEDURE — 25010000002 METHYLPREDNISOLONE PER 40 MG: Performed by: NEUROLOGICAL SURGERY

## 2024-04-05 PROCEDURE — 25010000002 PHENYLEPHRINE 10 MG/ML SOLUTION 5 ML VIAL

## 2024-04-05 PROCEDURE — 25810000003 LACTATED RINGERS PER 1000 ML: Performed by: NEUROLOGICAL SURGERY

## 2024-04-05 PROCEDURE — 25010000002 ONDANSETRON PER 1 MG

## 2024-04-05 PROCEDURE — 25010000002 PHENYLEPHRINE 10 MG/ML SOLUTION

## 2024-04-05 PROCEDURE — 25010000002 MIDAZOLAM PER 1 MG

## 2024-04-05 PROCEDURE — 25810000003 SODIUM CHLORIDE 0.9 % SOLUTION 250 ML FLEX CONT

## 2024-04-05 PROCEDURE — 25810000003 LACTATED RINGERS PER 1000 ML

## 2024-04-05 PROCEDURE — 25010000002 PROPOFOL 200 MG/20ML EMULSION

## 2024-04-05 PROCEDURE — 72100 X-RAY EXAM L-S SPINE 2/3 VWS: CPT

## 2024-04-05 PROCEDURE — 76000 FLUOROSCOPY <1 HR PHYS/QHP: CPT

## 2024-04-05 PROCEDURE — 25010000002 DEXAMETHASONE PER 1 MG

## 2024-04-05 PROCEDURE — 25010000002 FENTANYL CITRATE (PF) 100 MCG/2ML SOLUTION

## 2024-04-05 DEVICE — DEV CONTRL TISS STRATAFIX SPIRAL MNCRYL UD 3/0 PLS 30CM: Type: IMPLANTABLE DEVICE | Site: SPINE LUMBAR | Status: FUNCTIONAL

## 2024-04-05 DEVICE — FLOSEAL WITH RECOTHROM - 10ML.
Type: IMPLANTABLE DEVICE | Site: SPINE LUMBAR | Status: FUNCTIONAL
Brand: FLOSEAL HEMOSTATIC MATRIX

## 2024-04-05 RX ORDER — ALBUTEROL SULFATE 2.5 MG/3ML
2.5 SOLUTION RESPIRATORY (INHALATION) ONCE AS NEEDED
Status: DISCONTINUED | OUTPATIENT
Start: 2024-04-05 | End: 2024-04-05 | Stop reason: HOSPADM

## 2024-04-05 RX ORDER — MAGNESIUM SULFATE HEPTAHYDRATE 500 MG/ML
INJECTION, SOLUTION INTRAMUSCULAR; INTRAVENOUS AS NEEDED
Status: DISCONTINUED | OUTPATIENT
Start: 2024-04-05 | End: 2024-04-05 | Stop reason: SURG

## 2024-04-05 RX ORDER — LIDOCAINE HYDROCHLORIDE AND EPINEPHRINE 10; 10 MG/ML; UG/ML
INJECTION, SOLUTION INFILTRATION; PERINEURAL AS NEEDED
Status: DISCONTINUED | OUTPATIENT
Start: 2024-04-05 | End: 2024-04-05 | Stop reason: HOSPADM

## 2024-04-05 RX ORDER — SODIUM CHLORIDE, SODIUM LACTATE, POTASSIUM CHLORIDE, CALCIUM CHLORIDE 600; 310; 30; 20 MG/100ML; MG/100ML; MG/100ML; MG/100ML
INJECTION, SOLUTION INTRAVENOUS CONTINUOUS PRN
Status: DISCONTINUED | OUTPATIENT
Start: 2024-04-05 | End: 2024-04-05 | Stop reason: SURG

## 2024-04-05 RX ORDER — SUCCINYLCHOLINE CHLORIDE 20 MG/ML
INJECTION INTRAMUSCULAR; INTRAVENOUS AS NEEDED
Status: DISCONTINUED | OUTPATIENT
Start: 2024-04-05 | End: 2024-04-05 | Stop reason: SURG

## 2024-04-05 RX ORDER — LABETALOL HYDROCHLORIDE 5 MG/ML
5 INJECTION, SOLUTION INTRAVENOUS
Status: DISCONTINUED | OUTPATIENT
Start: 2024-04-05 | End: 2024-04-05 | Stop reason: HOSPADM

## 2024-04-05 RX ORDER — ONDANSETRON 2 MG/ML
INJECTION INTRAMUSCULAR; INTRAVENOUS AS NEEDED
Status: DISCONTINUED | OUTPATIENT
Start: 2024-04-05 | End: 2024-04-05 | Stop reason: SURG

## 2024-04-05 RX ORDER — FENTANYL CITRATE 50 UG/ML
50 INJECTION, SOLUTION INTRAMUSCULAR; INTRAVENOUS
Status: DISCONTINUED | OUTPATIENT
Start: 2024-04-05 | End: 2024-04-05 | Stop reason: HOSPADM

## 2024-04-05 RX ORDER — ONDANSETRON 2 MG/ML
4 INJECTION INTRAMUSCULAR; INTRAVENOUS ONCE AS NEEDED
Status: DISCONTINUED | OUTPATIENT
Start: 2024-04-05 | End: 2024-04-05 | Stop reason: HOSPADM

## 2024-04-05 RX ORDER — PHENYLEPHRINE HYDROCHLORIDE 10 MG/ML
INJECTION INTRAVENOUS AS NEEDED
Status: DISCONTINUED | OUTPATIENT
Start: 2024-04-05 | End: 2024-04-05 | Stop reason: SURG

## 2024-04-05 RX ORDER — FENTANYL CITRATE 50 UG/ML
25 INJECTION, SOLUTION INTRAMUSCULAR; INTRAVENOUS
Status: DISCONTINUED | OUTPATIENT
Start: 2024-04-05 | End: 2024-04-05 | Stop reason: HOSPADM

## 2024-04-05 RX ORDER — EPHEDRINE SULFATE 5 MG/ML
INJECTION INTRAVENOUS AS NEEDED
Status: DISCONTINUED | OUTPATIENT
Start: 2024-04-05 | End: 2024-04-05 | Stop reason: SURG

## 2024-04-05 RX ORDER — DEXMEDETOMIDINE HYDROCHLORIDE 100 UG/ML
INJECTION, SOLUTION INTRAVENOUS AS NEEDED
Status: DISCONTINUED | OUTPATIENT
Start: 2024-04-05 | End: 2024-04-05 | Stop reason: SURG

## 2024-04-05 RX ORDER — OXYCODONE HYDROCHLORIDE 5 MG/1
5 TABLET ORAL ONCE AS NEEDED
Status: DISCONTINUED | OUTPATIENT
Start: 2024-04-05 | End: 2024-04-05 | Stop reason: HOSPADM

## 2024-04-05 RX ORDER — HYDRALAZINE HYDROCHLORIDE 20 MG/ML
5 INJECTION INTRAMUSCULAR; INTRAVENOUS
Status: DISCONTINUED | OUTPATIENT
Start: 2024-04-05 | End: 2024-04-05 | Stop reason: HOSPADM

## 2024-04-05 RX ORDER — CYCLOBENZAPRINE HCL 10 MG
10 TABLET ORAL 3 TIMES DAILY PRN
Qty: 30 TABLET | Refills: 0 | Status: SHIPPED | OUTPATIENT
Start: 2024-04-05

## 2024-04-05 RX ORDER — FENTANYL CITRATE 50 UG/ML
INJECTION, SOLUTION INTRAMUSCULAR; INTRAVENOUS AS NEEDED
Status: DISCONTINUED | OUTPATIENT
Start: 2024-04-05 | End: 2024-04-05 | Stop reason: SURG

## 2024-04-05 RX ORDER — REMIFENTANIL HYDROCHLORIDE 1 MG/ML
INJECTION, POWDER, LYOPHILIZED, FOR SOLUTION INTRAVENOUS CONTINUOUS PRN
Status: DISCONTINUED | OUTPATIENT
Start: 2024-04-05 | End: 2024-04-05 | Stop reason: SURG

## 2024-04-05 RX ORDER — SODIUM CHLORIDE 0.9 % (FLUSH) 0.9 %
10 SYRINGE (ML) INJECTION AS NEEDED
Status: DISCONTINUED | OUTPATIENT
Start: 2024-04-05 | End: 2024-04-05 | Stop reason: HOSPADM

## 2024-04-05 RX ORDER — PROPOFOL 10 MG/ML
INJECTION, EMULSION INTRAVENOUS AS NEEDED
Status: DISCONTINUED | OUTPATIENT
Start: 2024-04-05 | End: 2024-04-05 | Stop reason: SURG

## 2024-04-05 RX ORDER — MIDAZOLAM HYDROCHLORIDE 1 MG/ML
INJECTION INTRAMUSCULAR; INTRAVENOUS AS NEEDED
Status: DISCONTINUED | OUTPATIENT
Start: 2024-04-05 | End: 2024-04-05 | Stop reason: SURG

## 2024-04-05 RX ORDER — DEXAMETHASONE SODIUM PHOSPHATE 4 MG/ML
INJECTION, SOLUTION INTRA-ARTICULAR; INTRALESIONAL; INTRAMUSCULAR; INTRAVENOUS; SOFT TISSUE AS NEEDED
Status: DISCONTINUED | OUTPATIENT
Start: 2024-04-05 | End: 2024-04-05 | Stop reason: SURG

## 2024-04-05 RX ORDER — NALOXONE HCL 0.4 MG/ML
0.4 VIAL (ML) INJECTION AS NEEDED
Status: DISCONTINUED | OUTPATIENT
Start: 2024-04-05 | End: 2024-04-05 | Stop reason: HOSPADM

## 2024-04-05 RX ORDER — LIDOCAINE HYDROCHLORIDE 10 MG/ML
0.5 INJECTION, SOLUTION INFILTRATION; PERINEURAL ONCE AS NEEDED
Status: DISCONTINUED | OUTPATIENT
Start: 2024-04-05 | End: 2024-04-05 | Stop reason: HOSPADM

## 2024-04-05 RX ORDER — DIPHENHYDRAMINE HYDROCHLORIDE 50 MG/ML
12.5 INJECTION INTRAMUSCULAR; INTRAVENOUS
Status: DISCONTINUED | OUTPATIENT
Start: 2024-04-05 | End: 2024-04-05 | Stop reason: HOSPADM

## 2024-04-05 RX ORDER — LIDOCAINE HYDROCHLORIDE 20 MG/ML
INJECTION, SOLUTION EPIDURAL; INFILTRATION; INTRACAUDAL; PERINEURAL AS NEEDED
Status: DISCONTINUED | OUTPATIENT
Start: 2024-04-05 | End: 2024-04-05 | Stop reason: SURG

## 2024-04-05 RX ORDER — METHYLPREDNISOLONE ACETATE 40 MG/ML
INJECTION, SUSPENSION INTRA-ARTICULAR; INTRALESIONAL; INTRAMUSCULAR; SOFT TISSUE AS NEEDED
Status: DISCONTINUED | OUTPATIENT
Start: 2024-04-05 | End: 2024-04-05 | Stop reason: HOSPADM

## 2024-04-05 RX ORDER — SODIUM CHLORIDE, SODIUM LACTATE, POTASSIUM CHLORIDE, CALCIUM CHLORIDE 600; 310; 30; 20 MG/100ML; MG/100ML; MG/100ML; MG/100ML
1000 INJECTION, SOLUTION INTRAVENOUS CONTINUOUS
Status: DISCONTINUED | OUTPATIENT
Start: 2024-04-05 | End: 2024-04-05 | Stop reason: HOSPADM

## 2024-04-05 RX ORDER — ROCURONIUM BROMIDE 10 MG/ML
INJECTION, SOLUTION INTRAVENOUS AS NEEDED
Status: DISCONTINUED | OUTPATIENT
Start: 2024-04-05 | End: 2024-04-05 | Stop reason: SURG

## 2024-04-05 RX ORDER — HYDROCODONE BITARTRATE AND ACETAMINOPHEN 5; 325 MG/1; MG/1
1 TABLET ORAL EVERY 6 HOURS PRN
Qty: 15 TABLET | Refills: 0 | Status: SHIPPED | OUTPATIENT
Start: 2024-04-05 | End: 2024-04-08 | Stop reason: SDUPTHER

## 2024-04-05 RX ORDER — OXYCODONE HCL 10 MG/1
10 TABLET, FILM COATED, EXTENDED RELEASE ORAL ONCE
Status: DISCONTINUED | OUTPATIENT
Start: 2024-04-05 | End: 2024-04-05

## 2024-04-05 RX ADMIN — REMIFENTANIL HYDROCHLORIDE 0.2 MCG/KG/MIN: 5 INJECTION, POWDER, LYOPHILIZED, FOR SOLUTION INTRAVENOUS at 09:04

## 2024-04-05 RX ADMIN — DEXAMETHASONE SODIUM PHOSPHATE 8 MG: 4 INJECTION, SOLUTION INTRAMUSCULAR; INTRAVENOUS at 09:07

## 2024-04-05 RX ADMIN — PHENYLEPHRINE HYDROCHLORIDE 0.5 MCG/KG/MIN: 10 INJECTION INTRAVENOUS at 09:16

## 2024-04-05 RX ADMIN — DEXMEDETOMIDINE HYDROCHLORIDE 4 MCG: 100 INJECTION, SOLUTION INTRAVENOUS at 10:12

## 2024-04-05 RX ADMIN — PHENYLEPHRINE HYDROCHLORIDE 200 MCG: 10 INJECTION INTRAVENOUS at 09:24

## 2024-04-05 RX ADMIN — DEXMEDETOMIDINE HYDROCHLORIDE 4 MCG: 100 INJECTION, SOLUTION INTRAVENOUS at 09:52

## 2024-04-05 RX ADMIN — CEFAZOLIN 2 G: 2 INJECTION, POWDER, FOR SOLUTION INTRAMUSCULAR; INTRAVENOUS at 09:05

## 2024-04-05 RX ADMIN — ONDANSETRON 4 MG: 2 INJECTION INTRAMUSCULAR; INTRAVENOUS at 10:14

## 2024-04-05 RX ADMIN — OXYCODONE HYDROCHLORIDE 10 MG: 10 TABLET, FILM COATED, EXTENDED RELEASE ORAL at 08:02

## 2024-04-05 RX ADMIN — PHENYLEPHRINE HYDROCHLORIDE 100 MCG: 10 INJECTION INTRAVENOUS at 09:07

## 2024-04-05 RX ADMIN — ROCURONIUM BROMIDE 5 MG: 10 INJECTION, SOLUTION INTRAVENOUS at 09:01

## 2024-04-05 RX ADMIN — PHENYLEPHRINE HYDROCHLORIDE 200 MCG: 10 INJECTION INTRAVENOUS at 09:21

## 2024-04-05 RX ADMIN — MIDAZOLAM HYDROCHLORIDE 2 MG: 2 INJECTION, SOLUTION INTRAMUSCULAR; INTRAVENOUS at 08:58

## 2024-04-05 RX ADMIN — SODIUM CHLORIDE, SODIUM LACTATE, POTASSIUM CHLORIDE, AND CALCIUM CHLORIDE: .6; .31; .03; .02 INJECTION, SOLUTION INTRAVENOUS at 08:55

## 2024-04-05 RX ADMIN — PROPOFOL 125 MCG/KG/MIN: 10 INJECTION, EMULSION INTRAVENOUS at 09:04

## 2024-04-05 RX ADMIN — EPHEDRINE SULFATE 10 MG: 5 INJECTION INTRAVENOUS at 09:21

## 2024-04-05 RX ADMIN — DEXMEDETOMIDINE HYDROCHLORIDE 4 MCG: 100 INJECTION, SOLUTION INTRAVENOUS at 09:45

## 2024-04-05 RX ADMIN — MAGNESIUM SULFATE HEPTAHYDRATE 1 G: 500 INJECTION, SOLUTION INTRAMUSCULAR; INTRAVENOUS at 09:30

## 2024-04-05 RX ADMIN — LIDOCAINE HYDROCHLORIDE 80 MG: 20 INJECTION, SOLUTION EPIDURAL; INFILTRATION; INTRACAUDAL; PERINEURAL at 09:01

## 2024-04-05 RX ADMIN — SODIUM CHLORIDE, POTASSIUM CHLORIDE, SODIUM LACTATE AND CALCIUM CHLORIDE 1000 ML: 600; 310; 30; 20 INJECTION, SOLUTION INTRAVENOUS at 07:56

## 2024-04-05 RX ADMIN — SUCCINYLCHOLINE CHLORIDE 160 MG: 20 INJECTION, SOLUTION INTRAMUSCULAR; INTRAVENOUS at 09:01

## 2024-04-05 RX ADMIN — FENTANYL CITRATE 50 MCG: 50 INJECTION, SOLUTION INTRAMUSCULAR; INTRAVENOUS at 09:01

## 2024-04-05 RX ADMIN — PHENYLEPHRINE HYDROCHLORIDE 200 MCG: 10 INJECTION INTRAVENOUS at 09:14

## 2024-04-05 RX ADMIN — PROPOFOL 200 MG: 10 INJECTION, EMULSION INTRAVENOUS at 09:01

## 2024-04-05 RX ADMIN — FENTANYL CITRATE 50 MCG: 50 INJECTION, SOLUTION INTRAMUSCULAR; INTRAVENOUS at 10:14

## 2024-04-05 RX ADMIN — DEXMEDETOMIDINE HYDROCHLORIDE 4 MCG: 100 INJECTION, SOLUTION INTRAVENOUS at 10:16

## 2024-04-05 NOTE — DISCHARGE SUMMARY
Date of Discharge:  4/5/2024    Discharge Diagnosis: Lumbar radiculopathy    Presenting Problem/History of Present Illness  Active Hospital Problems    Diagnosis  POA    **Lumbar radiculopathy [M54.16]  Unknown      Resolved Hospital Problems   No resolved problems to display.          Hospital Course  Patient is a 38 y.o. female presented with with several year history of worsening low back pain neurogenic claudication radiculopathy with disc herniation and central stenosis at L4-5 among other degenerative changes in her lumbar spine.  Patient underwent right L4-5 microdiscectomy.  She was transferred to the PACU postoperatively and once she met PACU criteria she was discharged.      Procedures Performed    Procedure(s):  Right lumbar 4 5 microdiscectomy  -------------------       Consults:   Consults       No orders found from 3/7/2024 to 4/6/2024.            Discharge Disposition  Home or Self Care    Discharge Medications     Discharge Medications        New Medications        Instructions Start Date   HYDROcodone-acetaminophen 5-325 MG per tablet  Commonly known as: Norco   1 tablet, Oral, Every 6 Hours PRN             Changes to Medications        Instructions Start Date   cyclobenzaprine 10 MG tablet  Commonly known as: FLEXERIL  What changed: when to take this   10 mg, Oral, 3 Times Daily PRN             Continue These Medications        Instructions Start Date   albuterol sulfate  (90 Base) MCG/ACT inhaler  Commonly known as: PROVENTIL HFA;VENTOLIN HFA;PROAIR HFA   2 puffs, Inhalation, Every 4 Hours PRN      dicyclomine 10 MG capsule  Commonly known as: BENTYL   10 mg, Oral, 4 Times Daily Before Meals & Nightly      diphenhydrAMINE 25 mg capsule  Commonly known as: BENADRYL   25 mg, Oral, Every 6 Hours PRN      etodolac 400 MG tablet  Commonly known as: LODINE   400 mg, Oral, Every 12 Hours PRN      Fluticasone-Salmeterol 250-50 MCG/ACT DISKUS  Commonly known as: ADVAIR/WIXELA   1 puff, Inhalation,  2 Times Daily      Omeprazole 20 MG tablet delayed-release   20 mg, Oral, Daily      oxybutynin XL 5 MG 24 hr tablet  Commonly known as: DITROPAN-XL   5 mg, Oral, Daily      sertraline 50 MG tablet  Commonly known as: ZOLOFT   50 mg, Oral, Daily             Stop These Medications      predniSONE 20 MG tablet  Commonly known as: DELTASONE              Discharge Diet: Regular    Activity at Discharge: No bending lifting or twisting    Follow-up Appointments  Future Appointments   Date Time Provider Department Center   4/16/2024  4:30 PM Carol Yu APRN [facility] [place]   4/19/2024  9:00 AM Thee Akbar PA [facility] [place]   10/14/2024 10:00 AM Carol Yu APRN [facility] [place]         Test Results Pending at Discharge       Primitivo Gutierrez IV, MD  04/05/24  10:39 EDT
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
## 2024-04-05 NOTE — ANESTHESIA PREPROCEDURE EVALUATION
Anesthesia Evaluation     Patient summary reviewed and Nursing notes reviewed   NPO Solid Status: > 8 hours  NPO Liquid Status: > 8 hours           Airway   Mallampati: II  TM distance: >3 FB  Neck ROM: full  No difficulty expected  Dental - normal exam   (+) partials        Pulmonary - normal exam    breath sounds clear to auscultation  (+) a smoker Current, Abstained day of surgery, cigarettes, asthma,recent URI, sleep apnea  Cardiovascular - normal exam  Exercise tolerance: unable to assess    ECG reviewed  Rhythm: regular  Rate: normal    (+) hyperlipidemia      Neuro/Psych  (+) numbness, psychiatric history  GI/Hepatic/Renal/Endo    (+) morbid obesity, GERD    Musculoskeletal (-) negative ROS    Abdominal  - normal exam   Substance History - negative use     OB/GYN negative ob/gyn ROS         Other - negative ROS                     Anesthesia Plan    ASA 3     general and ERAS Protocol   total IV anesthesia  (Oxycontin only eras pill due to allergies, GETA)  intravenous induction     Anesthetic plan, risks, benefits, and alternatives have been provided, discussed and informed consent has been obtained with: patient.      CODE STATUS:

## 2024-04-05 NOTE — DISCHARGE INSTRUCTIONS
Lumbar Microdiscectomy and Lumbar Decompression    Post-Operative Guide    Dr. Primitivo Gutierrez MD                                            Post-op  ACTIVITY GUIDE     -Okay to remove bandage day after surgery okay to shower day after surgery, cover  -Wound and keep clean and dry for 2 weeks    DAY OF SURGERY   “We want you to get up and Move!    Getting out of bed soon after surgery will speed your recovery!”    GOAL:  Out of bed 2 hours after awaking from surgery for your first walk  You may require assistance from nurse  A walker for stability may be used initially but briefly  Short frequent walks (5min) every 2 hours while in hospital  Engage core when getting in and out of bed   Use smart movement strategies when changing positions  Practice DEEP BREATHING while you walk  3-6 count inhale and exhale  Rely on ORAL pain medications NOT IV       ADVANTAGES:  Prevent blood clots in the legs  Prevents pneumonia through promoting deep breathing  Prevents back muscles from stiffening - will decrease pain   You CANNOT walk too much  Oral pain meds have better steady control of pain     POST-OP DAY #1   Diet / Activity / Pain Control / GO HOME    Assessments by Physical Therapy and Occupational Therapy (OT)    GOAL  Review proper spine mechanics/ restrictions  Walking up and down stairs is GOOD   PT / OT will assess when you are safe to go home  Activities of Daily Living - okay to shower, dress, navigate around house  Surgical Incision needs to be covered during showers unless otherwise advised by Dr. Gutierrez  No baths, hot tubs, swimming pools for 6 weeks or until incision closed without scab.   Go home !    Criteria for Discharge Home:  Cleared by PT / OT   Cleared by the Medicine Service  Adequate pain control with or without medications  Tolerating food and Liquids  Ability to urinate  Having a bowel movement IS NOT a discharge requirement unless there is a medical issue  Depends on pain control, support at home,  mobility    Occasionally some patients with extra care needs continue their recovery at a local rehabilitation facility (e.g. patients with minimal home social support, additional medical needs). Hospital based case coordinator will assist with rehab placement.     ACTIVITY GUIDELINES    Careful with the BLT's for 3 months !    Bending  Engage Core when changing positions   Use smart movement strategies - Squat, kneel, support yourself using arms  Bending with bad form once or twice is NOT a problem  Not using core to bend can “strain” back muscles    Lifting  General weight limit for first 6 weeks is a maximum of 20-40 lbs   Anything that causes “strain” should not be lifted  Coughing, sneezing, vomiting, straining with bowel movements can cause damage  Lap top, gallon of milk, purse, infant children okay  Luggage, large backpack, heavy grocery bag, furniture - NOT okay  Lift things close to body - limit reaching to pick things up    Twisting  Turn hips, shoulders and spine as one unit  Avoid reaching across the body  Activate core during more complex movements  Remember it is repetitive poor spine mechanics not solitary actions which can be harmful to your spine    Driving    - Okay to consider during first 2 weeks after surgery   - MUST meet following requirements    -You must be OFF narcotics and not under their influence     - You must be a SAFE  yourself.     - Patients may be considered to be UNSAFE if they are:     Distracted by their pain     Unable to sit comfortably for the required length of the journey               Unable to use mirrors safely because of restrictions or pain                ACTIVITY - FIRST 2 WEEKS:    Low Impact Aerobic Exercise  5-30min 2 times per day EVERY DAY  Walking, elliptical, stairs and/or recumbent bike outdoors,  Slow safe pace, okay to do intervals (walk 4 min rest 1 min x 3 -5 sets)  No hiking, speed walking or carrying.   FOCUS ON POSTURE, DEEP BREATHING (6 count)  AND CORE ACTIVATION    Physical Therapy  Will start after your first post-operative office visit (2 weeks)  It's Important, So, YES it's Mandatory  2 times per week x 12 weeks  We can recommend the Physical Therapist near your home  They should help guide your core exercise program  Home exercises and low impact aerobic work  should be done 4-5x per week in addition to PT   The static core program we recommend your physical therapist take you though is attached  Additional modalities are balance and light resistance band training     RETURNING TO WORK     The timescale for Return to Work will vary from patient to patient and depends mainly on the nature of your specific surgery and the type of work / activity you wish to recommence.     General Guidelines:    Can work from home if needed within the first week or so of surgery  Do not work for longer than 30-45 minutes at a time without a break (standing and walking around)    Sedentary jobs (desk work, etc)   Typically within 1-6 weeks  Depends on particulars of job, driving distance, stress, etc  Light duty  <20 lbs weight limit, Minimal BLT  If you have the option, sometimes returning to work alternate days (i.e Mon-Wed-Fri) for 1-2 weeks assists with the transition back to work.     Manual Labor  3-6 months - varies per case  depends on intensity and weight limit and specific surgery   must have exhibited a strong core with Level 3 or higher on core program or a note from PT reflecting a strong core                RECREATIONAL SPORTS & ACTIVITIES     After 2 weeks  swimming    After 6 weeks   hiking (no Pack, light grade)  Biking, jogging, resistance training, climbing, Pilates, sports specific drills, body weight interval training, golf, tennis  Core level 3 completed  Start slow and build up slowly   Do NOT go back FULL SPEED right away     After 3 months  Skiing, horseback riding, ATV riding, snow mobile etc      Sports - Non-Contact  Minimum 6 weeks  Must  have core level 3 or greater  Must have completed aerobic and resistance training   Must have successfully done sports specific drill and been asymptomatic    Sports - Contact   Varies from sport to sport; usual 3-6 months  Must exhibit strong Core Level 4 or 5   Must have completed non-contact  sports specific drills without any symptoms      Lifelong recommendations:  Warm up before EVERY activity 5-10 minutes using Recumbent bike, Stair Master, elliptical , speed walk  Core exercises:   3 -5 x /week - forever!  10 minutes  Should follow warm up prior to activity / sport  Always know your core level

## 2024-04-05 NOTE — ANESTHESIA POSTPROCEDURE EVALUATION
Patient: Carie Troy    Procedure Summary       Date: 04/05/24 Room / Location: [facility] OR 12 / [facility] MAIN OR    Anesthesia Start: 0855 Anesthesia Stop: 1039    Procedure: Right lumbar 4 5 microdiscectomy (Spine Lumbar) Diagnosis:       Lumbar radiculopathy      (Lumbar radiculopathy [M54.16])    Surgeons: Primitivo Gutierrez IV, MD Provider: Zeferino Rodriguez MD    Anesthesia Type: general, ERAS Protocol ASA Status: 3            Anesthesia Type: general, ERAS Protocol    Vitals  Vitals Value Taken Time   /78 04/05/24 1142   Temp 99 °F (37.2 °C) 04/05/24 1142   Pulse 83 04/05/24 1143   Resp 12 04/05/24 1142   SpO2 91 % 04/05/24 1143   Vitals shown include unfiled device data.        Post Anesthesia Care and Evaluation    Patient location during evaluation: PACU  Patient participation: complete - patient participated  Level of consciousness: awake  Pain scale: See nurse's notes for pain score.  Pain management: adequate    Airway patency: patent  Anesthetic complications: No anesthetic complications  PONV Status: none  Cardiovascular status: acceptable  Respiratory status: acceptable and spontaneous ventilation  Hydration status: acceptable    Comments: Patient seen and examined postoperatively; vital signs stable; SpO2 greater than or equal to 90%; cardiopulmonary status stable; nausea/vomiting adequately controlled; pain adequately controlled; no apparent anesthesia complications; patient discharged from anesthesia care when discharge criteria were met

## 2024-04-05 NOTE — OP NOTE
LUMBAR DISCECTOMY MICRO  Procedure Report    Patient Name:  Carie Troy  YOB: 1985    Date of Surgery:  4/5/2024     Indications: 38-year-old female with a several year history of worsening low back pain neurogenic claudication and radiculopathy with multilevel degenerative changes but most severe at L4-5 where there is a right paracentral disc herniation resulting in moderate to severe central stenosis and lateral recess stenosis.  Patient failed conservative measures.  Given this patient was taken to the OR for right L4-5 microdiscectomy.  Patient understood the risks of surgery including bleeding infection CSF leak nerve damage spinal cord injury disc reherniation no improvement of symptoms need for future operation including fusion surgery and she agreed to undergo the procedure    Pre-op Diagnosis:   Lumbar radiculopathy [M54.16]       Post-Op Diagnosis Codes:     * Lumbar radiculopathy [M54.16]    Procedure/CPT® Codes:      Procedure(s):  Right lumbar 4 5 hemilaminotomy, medial facetectomy microdiscectomy    Staff:  Surgeon(s):  Primitivo Gutierrez IV, MD         Anesthesia: General    Estimated Blood Loss: 20 mL    Implants:    Implant Name Type Inv. Item Serial No.  Lot No. LRB No. Used Action   DEV CONTRL TISS STRATAFIX SPIRAL MNCRYL UD 3/0 PLS 30CM - [id number] Implant DEV CONTRL TISS STRATAFIX SPIRAL MNCRYL UD 3/0 PLS 30CM  [employer]  DIV [employer] [id number] N/A 1 Implanted   KT SEAL HEMOS ABS FLOSEAL MATRX 1.5/FAST/PREP 5000/IU 10ML - [id number] Implant KT SEAL HEMOS ABS FLOSEAL MATRX 1.5/FAST/PREP 5000/IU 10ML  [facility] [id number] N/A 1 Implanted       Specimen:          None        Findings: herniated disc    Complications: None    Description of Procedure: Patient was brought to the OR and placed under general endotracheal anesthesia.  She was flipped into the prone position on Flex table and prepped and draped in usual fashion.  AP and lateral fluoroscopy  was used to plan an incision over the L4-5 disc space on the right along the medial pedicular border.  Incision was made carried down to the fascia.  Tubular retractor was placed over the L4-5 disc space on the right under AP and lateral fluoroscopic guidance.  Microscope was brought to the field.  Remaining tissue was removed from over the lamina and medial facet.  High-speed drill was used to remove the lamina and medial facet.  3 Kerrison was used to remove remaining shelled out bone.  Ligamentum flavum was opened and removed with 3 Kerrison.  Disc space was identified in Kambin's triangle and opened with a 15 blade.  Nerve hook and downgoing curettes were used to remove several fragments of herniated disc fragments.  Epidural space was explored with no obvious remaining disc fragments the thecal sac appeared less compressed.  Hemostasis was achieved with bipolar cautery and Gelfoam powder and the wound was thoroughly irrigated.  Steroid was placed over the thecal sac.  Tubular retractor was removed.  Fascia was closed with 0 Vicryl sutures, the deep dermal layer with 2-0 Vicryl sutures and the skin was closed with running subcuticular Monocryl.  Dermabond was placed over the wound.  There were no changes in neuromonitoring throughout the case.                      Primitivo Gutierrez IV, MD     Date: 4/5/2024  Time: 10:31 EDT

## 2024-04-05 NOTE — ANESTHESIA PROCEDURE NOTES
Airway  Date/Time: 4/5/2024 9:03 AM    General Information and Staff    Patient location during procedure: OR  CRNA/CAA: Daniela Fletcher CRNA    Indications and Patient Condition  Indications for airway management: airway protection    Preoxygenated: yes  MILS maintained throughout  Mask difficulty assessment: 1 - vent by mask    Final Airway Details  Final airway type: endotracheal airway      Successful airway: ETT  Cuffed: yes   Successful intubation technique: video laryngoscopy  Facilitating devices/methods: intubating stylet  Endotracheal tube insertion site: oral  Blade: Hurley  ETT size (mm): 7.0  Cormack-Lehane Classification: grade I - full view of glottis  Placement verified by: chest auscultation and capnometry   Measured from: lips  ETT/EBT  to lips (cm): 21  Number of attempts at approach: 1  Assessment: lips, teeth, and gum same as pre-op and atraumatic intubation

## 2024-04-08 DIAGNOSIS — M54.16 LUMBAR RADICULOPATHY: ICD-10-CM

## 2024-04-08 DIAGNOSIS — M54.16 LUMBAR RADICULOPATHY: Primary | ICD-10-CM

## 2024-04-08 RX ORDER — HYDROCODONE BITARTRATE AND ACETAMINOPHEN 5; 325 MG/1; MG/1
1 TABLET ORAL EVERY 6 HOURS PRN
Qty: 30 TABLET | Refills: 0 | Status: SHIPPED | OUTPATIENT
Start: 2024-04-08 | End: 2024-04-09

## 2024-04-08 RX ORDER — HYDROCODONE BITARTRATE AND ACETAMINOPHEN 5; 325 MG/1; MG/1
1 TABLET ORAL EVERY 6 HOURS PRN
Qty: 15 TABLET | Refills: 0 | Status: SHIPPED | OUTPATIENT
Start: 2024-04-08 | End: 2024-04-08 | Stop reason: SDUPTHER

## 2024-04-08 RX ORDER — HYDROCODONE BITARTRATE AND ACETAMINOPHEN 5; 325 MG/1; MG/1
1 TABLET ORAL EVERY 6 HOURS PRN
Qty: 30 TABLET | Refills: 0 | Status: SHIPPED | OUTPATIENT
Start: 2024-04-08 | End: 2024-04-08 | Stop reason: SDUPTHER

## 2024-04-08 NOTE — TELEPHONE ENCOUNTER
Patient called and stated medication was under [place] [facility] and needs to be under [place] [facility]. Patient is aware new medication will be sent in.

## 2024-04-08 NOTE — TELEPHONE ENCOUNTER
Patient called and wanted to know if the prescription for hydrocodone has been sent in yet. I told her that I will put this request in to him again, but that he has been seeing patients all day and will need to approve and sign this if agreeable.I did explain that someone will reach out to her sometime today.  She understood.

## 2024-04-08 NOTE — TELEPHONE ENCOUNTER
Rx Refill Note  Requested Prescriptions      No prescriptions requested or ordered in this encounter      Last office visit with prescribing clinician: 2/12/2024   Last telemedicine visit with prescribing clinician: Visit date not found   Next office visit with prescribing clinician: 6/10/2024                         Would you like a call back once the refill request has been completed: [] Yes [] No    If the office needs to give you a call back, can they leave a voicemail: [] Yes [] No    Artem Christiansen MA  04/08/24, 13:02 EDT

## 2024-04-08 NOTE — TELEPHONE ENCOUNTER
Rx Refill Note  Requested Prescriptions     Signed Prescriptions Disp Refills    HYDROcodone-acetaminophen (Norco) 5-325 MG per tablet 15 tablet 0     Sig: Take 1 tablet by mouth Every 6 (Six) Hours As Needed for Moderate Pain.     Authorizing Provider: KADI MOJICA IV      Last office visit with prescribing clinician: 2/12/2024   Last telemedicine visit with prescribing clinician: Visit date not found   Next office visit with prescribing clinician: 6/10/2024                         Would you like a call back once the refill request has been completed: [] Yes [] No    If the office needs to give you a call back, can they leave a voicemail: [] Yes [] No    Artem Christiansen MA  04/08/24, 16:13 EDT

## 2024-04-09 RX ORDER — HYDROCODONE BITARTRATE AND ACETAMINOPHEN 5; 325 MG/1; MG/1
1 TABLET ORAL EVERY 6 HOURS PRN
Qty: 30 TABLET | Refills: 0 | Status: SHIPPED | OUTPATIENT
Start: 2024-04-09 | End: 2024-04-10 | Stop reason: SDUPTHER

## 2024-04-09 NOTE — TELEPHONE ENCOUNTER
Patient called asking about her pain medication. I informed her he's still doing surgeries but I will put a message in.

## 2024-04-10 DIAGNOSIS — M54.16 LUMBAR RADICULOPATHY: ICD-10-CM

## 2024-04-10 RX ORDER — HYDROCODONE BITARTRATE AND ACETAMINOPHEN 5; 325 MG/1; MG/1
1 TABLET ORAL EVERY 6 HOURS PRN
Qty: 30 TABLET | Refills: 0 | Status: SHIPPED | OUTPATIENT
Start: 2024-04-10

## 2024-04-10 NOTE — TELEPHONE ENCOUNTER
Spoke with the patient about her incision, she said it has some reddish-clear drainage. It is minimal, she said she has some bruising. I told her everything sounds normal however she should send us a picture and we will have the provider look at it and then we will call her back. I also told her we are working on her pain medication situation. I apologized to her about this situation and told her I will have our APC send over her RX.

## 2024-04-10 NOTE — TELEPHONE ENCOUNTER
Patient sent picture via dacia. I asked her if she had any fever or chills. Patient did not. Valerie stated incision looks good to just watch for increased redness.

## 2024-04-12 ENCOUNTER — TELEPHONE (OUTPATIENT)
Dept: NEUROSURGERY | Facility: CLINIC | Age: 39
End: 2024-04-12
Payer: MEDICAID

## 2024-04-12 NOTE — TELEPHONE ENCOUNTER
Patient called stating she has had a fever of 102 and then both arm numbness and tingling. That she wasn't feeling well. She stated she went to [facility] ed they found she has some infection somewhere and pt her on bactrim for the infection. I did talk to a provider about her symptoms and they stated she needs to go to the ed and patient stated she will keep the appointment with Thee. I'm am unaware if the patient is going to the ed but was informed she should.

## 2024-04-12 NOTE — TELEPHONE ENCOUNTER
Patient called asking if she could let water run over her incision yet. I stated no to cover it when in the shower and uncover when out at least until you follow up. She stated she understood.

## 2024-04-13 ENCOUNTER — APPOINTMENT (OUTPATIENT)
Dept: GENERAL RADIOLOGY | Facility: HOSPITAL | Age: 39
End: 2024-04-13
Payer: MEDICAID

## 2024-04-13 ENCOUNTER — HOSPITAL ENCOUNTER (EMERGENCY)
Facility: HOSPITAL | Age: 39
Discharge: HOME | End: 2024-04-13
Attending: EMERGENCY MEDICINE
Payer: MEDICAID

## 2024-04-13 VITALS
HEIGHT: 61 IN | OXYGEN SATURATION: 100 % | TEMPERATURE: 97.7 F | RESPIRATION RATE: 18 BRPM | WEIGHT: 218.92 LBS | BODY MASS INDEX: 41.33 KG/M2 | HEART RATE: 116 BPM | DIASTOLIC BLOOD PRESSURE: 83 MMHG | SYSTOLIC BLOOD PRESSURE: 135 MMHG

## 2024-04-13 DIAGNOSIS — R20.2 PARESTHESIAS: Primary | ICD-10-CM

## 2024-04-13 PROCEDURE — 99283 EMERGENCY DEPT VISIT LOW MDM: CPT

## 2024-04-13 PROCEDURE — 72040 X-RAY EXAM NECK SPINE 2-3 VW: CPT

## 2024-04-13 RX ORDER — PREDNISONE 20 MG/1
20 TABLET ORAL DAILY
Qty: 5 TABLET | Refills: 0 | Status: SHIPPED | OUTPATIENT
Start: 2024-04-13 | End: 2024-04-22

## 2024-04-13 NOTE — ED PROVIDER NOTES
Subjective     Provider in Triage Note  Due to significant overcrowding in the emergency department, this patient was initially seen and evaluated in triage.  Provider in triage recommended patient be placed in treatment area to initiate therapy with movement to an ER bed as soon as possible.    Patient is a 38-year old female with no medical history who presents with complaints of right hand tingling and numbness that started 3 days ago.  She states it is also happening mildly in her left.  She also complains of intermittent back pain but currently denies.  She denies any recent falls, trauma or injury.  She does not have any carpal tunnel history.      History of Present Illness  I interviewed the patient HPI and agree with the nurse practitioner provided triage note as noted above  Review of Systems    Past Medical History:   Diagnosis Date    Allergic     Asthma     Chronic back pain     GERD (gastroesophageal reflux disease)     Hypotension     IBS (irritable bowel syndrome)     Prediabetes 10/19/2023       Allergies   Allergen Reactions    Gabapentin Other (See Comments)     Makes very tired    Ibuprofen Rash    Tylenol [Acetaminophen] Rash       Past Surgical History:   Procedure Laterality Date     SECTION  2012    [place]ane     SECTION  10/12/2015    [place]     SECTION  07/10/2018    Aries     SECTION  12/10/2019    Joel    DILATION AND CURETTAGE, DIAGNOSTIC / THERAPEUTIC  2007    LUMBAR DISCECTOMY N/A 2024    Procedure: Right lumbar 4 5 microdiscectomy;  Surgeon: Primitivo Gutierrez IV, MD;  Location: [facility] MAIN OR;  Service: Neurosurgery;  Laterality: N/A;       Family History   Adopted: Yes   Problem Relation Age of Onset    Hypertension Mother        Social History     Socioeconomic History    Marital status:    Tobacco Use    Smoking status: Every Day     Current packs/day: 0.50     Average packs/day: 0.5 packs/day for 18.0 years (9.0 ttl pk-yrs)     Types:  Cigarettes    Smokeless tobacco: Never   Vaping Use    Vaping status: Some Days    Substances: Nicotine   Substance and Sexual Activity    Alcohol use: Yes     Comment: for hoildays     Drug use: Yes     Types: Marijuana    Sexual activity: Yes     Partners: Male     Comment:             Objective   Physical Exam  Neck has no adenopathy JVD or bruits.  She has no meningismus.  Extremity exam shows full range of motion of all joints.  She has 2+ pulses and is neurovascular intact.  Procedures           ED Course      XR Spine Cervical 2 or 3 View    Result Date: 4/13/2024  Impression: Multiple views of the cervical spine were obtained. No acute fracture is identified. Alignment is unremarkable. Craniocervical and atlantoaxial relationships are unremarkable. No prevertebral soft tissue swelling is seen. Lung apices appear grossly unremarkable. Electronically Signed: Jonas Loomis MD  4/13/2024 5:46 PM EDT  Workstation ID: [id number]                                          Medical Decision Making  My interpretation.  C-spine x-ray shows no fracture dislocation or arthritic change.  Patient be discharged.  She will be placed on prednisone.  She will follow with her MD for recheck.    Amount and/or Complexity of Data Reviewed  Radiology: ordered and independent interpretation performed.    Risk  Prescription drug management.        Final diagnoses:   Paresthesias       ED Disposition  ED Disposition       ED Disposition   Discharge    Condition   Stable    Comment   --               No follow-up provider specified.       Medication List        New Prescriptions      predniSONE 20 MG tablet  Commonly known as: DELTASONE  Take 1 tablet by mouth Daily.               Where to Get Your Medications        Information about where to get these medications is not yet available    Ask your nurse or doctor about these medications  predniSONE 20 MG tablet            Sabas Angel MD  04/13/24 [id number]

## 2024-04-15 DIAGNOSIS — K58.2 IRRITABLE BOWEL SYNDROME WITH BOTH CONSTIPATION AND DIARRHEA: ICD-10-CM

## 2024-04-15 RX ORDER — DICYCLOMINE HYDROCHLORIDE 10 MG/1
10 CAPSULE ORAL
Qty: 120 CAPSULE | Refills: 0 | Status: SHIPPED | OUTPATIENT
Start: 2024-04-15

## 2024-04-15 NOTE — TELEPHONE ENCOUNTER
Patient called the office to see if she needs antibiotics prior to having some dental work done? She needs about 8 teeth pulled.    Verbally spoke with Dr. Gutierrez, she should wait 6 weeks if possible before her dental procedures.       [facility] [phone]

## 2024-04-17 DIAGNOSIS — M54.16 LUMBAR RADICULOPATHY: ICD-10-CM

## 2024-04-17 RX ORDER — HYDROCODONE BITARTRATE AND ACETAMINOPHEN 5; 325 MG/1; MG/1
1 TABLET ORAL EVERY 6 HOURS PRN
Qty: 30 TABLET | Refills: 0 | Status: SHIPPED | OUTPATIENT
Start: 2024-04-17

## 2024-04-17 NOTE — TELEPHONE ENCOUNTER
Patient sent Arimaz message requesting refill.    Rx Refill Note  Requested Prescriptions     Pending Prescriptions Disp Refills    HYDROcodone-acetaminophen (Norco) 5-325 MG per tablet 30 tablet 0     Sig: Take 1 tablet by mouth Every 6 (Six) Hours As Needed for Moderate Pain.      Last office visit with prescribing clinician: 2/12/2024   Last telemedicine visit with prescribing clinician: Visit date not found   Next office visit with prescribing clinician: 6/10/2024                         Would you like a call back once the refill request has been completed: [] Yes [] No    If the office needs to give you a call back, can they leave a voicemail: [] Yes [] No    Love Darling MA  04/17/24, 12:52 EDT    Carie MARTINEZ [facility] [place] (supporting Artem Christiansen MA)17 hours ago (7:26 PM)       Im being very easy on lifting things, walking long distant makes me hurt very bad n doing light cleaning also im out of meds for pain n need more please an thnk u

## 2024-04-17 NOTE — PROGRESS NOTES
Subjective     Chief Complaint   Patient presents with    Post-op     4/2         Previous Treatment: right lumbar 4 5 microdiscectomy 4/5/24     HPI: Carie Troy is a 38 y.o. female here for initial postoperative follow-up after undergoing the above procedure with Dr. Gutierrez.  Patient doing well postoperatively with some initial improvement in her claudicating symptoms.  He is able to stand for longer periods of time than before, though she does still have to sit down after a prolonged period.  She has expected levels of surgical site soreness which is well-controlled with her pain medication.  She does feel like she may have overdone it recently with some bending and twisting at the waist that bending and twisting at the waist that she was instructed to avoid.  Otherwise she reports no new weakness, fever, or chills.    PMH:  Past Medical History:   Diagnosis Date    Allergic     Asthma     Chronic back pain     GERD (gastroesophageal reflux disease)     Hypotension     IBS (irritable bowel syndrome)     Prediabetes 10/19/2023         Current Outpatient Medications:     albuterol sulfate  (90 Base) MCG/ACT inhaler, Inhale 2 puffs Every 4 (Four) Hours As Needed for Wheezing., Disp: 9 g, Rfl: 0    cyclobenzaprine (FLEXERIL) 10 MG tablet, Take 1 tablet by mouth 3 (Three) Times a Day As Needed for Muscle Spasms., Disp: 30 tablet, Rfl: 0    dicyclomine (BENTYL) 10 MG capsule, TAKE 1 CAPSULE BY MOUTH 4 TIMES DAILY BEFORE MEAL(S) AND AT BEDTIME, Disp: 120 capsule, Rfl: 0    diphenhydrAMINE (BENADRYL) 25 mg capsule, Take 1 capsule by mouth Every 6 (Six) Hours As Needed for Itching or Allergies., Disp: , Rfl:     etodolac (LODINE) 400 MG tablet, Take 1 tablet by mouth Every 12 (Twelve) Hours As Needed (back pain)., Disp: 60 tablet, Rfl: 0    Fluticasone-Salmeterol (ADVAIR/WIXELA) 250-50 MCG/ACT DISKUS, Inhale 1 puff 2 (Two) Times a Day., Disp: 60 each, Rfl: 0    HYDROcodone-acetaminophen (Norco) 5-325 MG per  tablet, Take 1 tablet by mouth Every 6 (Six) Hours As Needed for Moderate Pain., Disp: 30 tablet, Rfl: 0    meloxicam (MOBIC) 15 MG tablet, Take 1 tablet by mouth Daily., Disp: , Rfl:     Omeprazole 20 MG tablet delayed-release, Take 20 mg by mouth Daily., Disp: , Rfl:     oxybutynin XL (DITROPAN-XL) 5 MG 24 hr tablet, Take 1 tablet by mouth Daily., Disp: 30 tablet, Rfl: 11    methylPREDNISolone (MEDROL) 4 MG dose pack, Take as directed on package instructions., Disp: 1 each, Rfl: 0    sertraline (ZOLOFT) 50 MG tablet, Take 1 tablet by mouth Daily. (Patient not taking: Reported on 2024), Disp: 30 tablet, Rfl: 11     Allergies   Allergen Reactions    Gabapentin Other (See Comments)     Makes very tired    Ibuprofen Rash    Tylenol [Acetaminophen] Rash        Past Surgical History:   Procedure Laterality Date     SECTION  2012    Jeane     SECTION  10/12/2015    [place]     SECTION  07/10/2018    Aries     SECTION  12/10/2019    Joel    DILATION AND CURETTAGE, DIAGNOSTIC / THERAPEUTIC  2007    LUMBAR DISCECTOMY N/A 2024    Procedure: Right lumbar 4 5 microdiscectomy;  Surgeon: Primitivo Gutierrez IV, MD;  Location: [facility] MAIN OR;  Service: Neurosurgery;  Laterality: N/A;        Family History   Adopted: Yes   Problem Relation Age of Onset    Hypertension Mother          Social Hx:  Social History     Tobacco Use   Smoking Status Every Day    Current packs/day: 0.50    Average packs/day: 0.5 packs/day for 18.0 years (9.0 ttl pk-yrs)    Types: Cigarettes   Smokeless Tobacco Never      Alcohol Use: Not on file      Social History     Substance and Sexual Activity   Drug Use Yes    Types: Marijuana          Review of Systems   Constitutional:  Positive for activity change.   HENT: Negative.     Eyes: Negative.    Respiratory: Negative.     Cardiovascular: Negative.    Gastrointestinal: Negative.    Endocrine: Negative.    Genitourinary: Negative.    Musculoskeletal:  Positive for  "arthralgias, back pain and myalgias.   Skin:         Looks good   Allergic/Immunologic: Negative.    Neurological:  Positive for numbness (+tingling in er hands).        Sharp pain it feels like her back is going to give out    Hematological: Negative.    Psychiatric/Behavioral:  Positive for sleep disturbance.          Objective     /87   Pulse 76   Resp 18   Ht 154.9 cm (61\")   Wt 106 kg (233 lb)   LMP 04/08/2024 (Exact Date)   SpO2 97%   BMI 44.02 kg/m²    Body mass index is 44.02 kg/m².      Physical Exam  Vitals reviewed.   Constitutional:       General: She is not in acute distress.     Appearance: Normal appearance.   Cardiovascular:      Rate and Rhythm: Normal rate and regular rhythm.      Pulses: Normal pulses.   Pulmonary:      Effort: Pulmonary effort is normal. No respiratory distress.   Musculoskeletal:         General: No swelling or tenderness. Normal range of motion.      Right lower leg: No edema.      Left lower leg: No edema.   Skin:     General: Skin is warm and dry.      Findings: No bruising, erythema or rash.      Comments: Surgical incision CDI w/ no swelling redness or drainage   Neurological:      General: No focal deficit present.      Mental Status: She is alert and oriented to person, place, and time.      Sensory: Sensation is intact.      Motor: Motor function is intact. No weakness.                  Assessment & Plan     MDM: Carie Troy is a 38 y.o. female here for initial postoperative follow-up after undergoing L4-5 microdiscectomy with Dr. Gutierrez on 4/5/2024.  Patient is neurologically stable with some initial improvement in her subjective claudicating symptoms.  Postoperative pain well-controlled.  She does have some increased pain today due to bending and twisting that she was instructed not to do.  I am sending in a Medrol Dosepak to help curb some acute inflammatory pain.  Reminded her to avoid bending and twisting as well as heavy lifting during this " postoperative period.    At this point patient should begin outpatient PT and advance their level of activity w/ PT guidance. They may return to work with light duty. No lifting >30 lbs. They may shower but should refrain from submerging the incision underwater for the next 4 weeks. I will have them follow-up with Dr. Gutierrez 3 months postoperatively.  Patient is agreeable to this plan.         Diagnosis Plan   1. Lumbar radiculopathy  Ambulatory Referral to Physical Therapy POST OP    methylPREDNISolone (MEDROL) 4 MG dose pack      2. Lumbar stenosis with neurogenic claudication  Ambulatory Referral to Physical Therapy POST OP    methylPREDNISolone (MEDROL) 4 MG dose pack          Return in about 3 months (around 7/22/2024) for 3-month postoperative follow-up with Dr. Gutierrez.                This patient was examined wearing appropriate personal protective equipment.            Thee Akbar PA-C    04/22/24  11:19 EDT      Part of this note may be an electronic transcription/translation of spoken language to printed text using the Dragon Dictation System.

## 2024-04-18 NOTE — TELEPHONE ENCOUNTER
Patient called the office to get a refill on her pain medications, she reports that she is having increased pain from basic cleaning, errands etc. I told her she is not supposed to be doing any bending, lifting over 10 lb or twisting. These restrictions are in place until she has her fup with Thee. I asked her if she was taking the Norco as prescribed? She said yes. She then said that the pain was so bad she has been taking it every 4 hours. I explained to her that she has to take them as prescribed, she cannot change it on her own. But it sounds like she is having increased pain because she is doing more than she should currently. Her surgery was just on 4/5/2024. I told her it looks like Dr. Gutierrez refilled her pain medications yesterday however it most likely is too early to fill them as she has not been taking them as prescribed. She will check with the pharmacy and call us if there is problems.    Directions are 1 every 6 hours PRN pain.   Inspect completed. She filled #15 on 4/5/2024 and then # 30 on 4/10/2024.

## 2024-04-22 ENCOUNTER — OFFICE VISIT (OUTPATIENT)
Dept: NEUROSURGERY | Facility: CLINIC | Age: 39
End: 2024-04-22
Payer: MEDICAID

## 2024-04-22 VITALS
HEIGHT: 61 IN | DIASTOLIC BLOOD PRESSURE: 87 MMHG | HEART RATE: 76 BPM | OXYGEN SATURATION: 97 % | WEIGHT: 233 LBS | BODY MASS INDEX: 43.99 KG/M2 | RESPIRATION RATE: 18 BRPM | SYSTOLIC BLOOD PRESSURE: 146 MMHG

## 2024-04-22 DIAGNOSIS — M48.062 LUMBAR STENOSIS WITH NEUROGENIC CLAUDICATION: ICD-10-CM

## 2024-04-22 DIAGNOSIS — M54.16 LUMBAR RADICULOPATHY: Primary | ICD-10-CM

## 2024-04-22 PROCEDURE — 99024 POSTOP FOLLOW-UP VISIT: CPT

## 2024-04-22 PROCEDURE — 1159F MED LIST DOCD IN RCRD: CPT

## 2024-04-22 PROCEDURE — 1160F RVW MEDS BY RX/DR IN RCRD: CPT

## 2024-04-22 RX ORDER — METHYLPREDNISOLONE 4 MG/1
TABLET ORAL
Qty: 1 EACH | Refills: 0 | Status: SHIPPED | OUTPATIENT
Start: 2024-04-22

## 2024-04-22 RX ORDER — MELOXICAM 15 MG/1
1 TABLET ORAL DAILY
COMMUNITY
Start: 2024-04-20

## 2024-04-23 DIAGNOSIS — M54.16 LUMBAR RADICULOPATHY: Primary | ICD-10-CM

## 2024-04-23 NOTE — TELEPHONE ENCOUNTER
Patient called asking for a new order to be sent to the pharmacy for her pain medication. She stated that's all the pharmacy needs to fill it. I stated I would send the message toRx Refill Note  Requested Prescriptions      No prescriptions requested or ordered in this encounter      Last office visit with prescribing clinician: 2/12/2024   Last telemedicine visit with prescribing clinician: Visit date not found   Next office visit with prescribing clinician: 6/10/2024                         Would you like a call back once the refill request has been completed: [] Yes [] No    If the office needs to give you a call back, can they leave a voicemail: [] Yes [] No    Artem Christiansen MA  04/23/24, 14:52 EDT him as I can't fill it.

## 2024-04-24 DIAGNOSIS — M54.16 LUMBAR RADICULOPATHY: ICD-10-CM

## 2024-04-24 RX ORDER — HYDROCODONE BITARTRATE AND ACETAMINOPHEN 5; 325 MG/1; MG/1
1 TABLET ORAL EVERY 6 HOURS PRN
Qty: 30 TABLET | Refills: 0 | Status: SHIPPED | OUTPATIENT
Start: 2024-04-24

## 2024-04-24 RX ORDER — CYCLOBENZAPRINE HCL 10 MG
10 TABLET ORAL 3 TIMES DAILY PRN
Qty: 30 TABLET | Refills: 0 | Status: SHIPPED | OUTPATIENT
Start: 2024-04-24

## 2024-04-24 NOTE — TELEPHONE ENCOUNTER
Rx Refill Note  Requested Prescriptions     Pending Prescriptions Disp Refills    cyclobenzaprine (FLEXERIL) 10 MG tablet 30 tablet 0     Sig: Take 1 tablet by mouth 3 (Three) Times a Day As Needed for Muscle Spasms.      Last office visit with prescribing clinician: 2/12/2024   Last telemedicine visit with prescribing clinician: Visit date not found   Next office visit with prescribing clinician: 6/10/2024                         Would you like a call back once the refill request has been completed: [] Yes [] No    If the office needs to give you a call back, can they leave a voicemail: [] Yes [] No    Love Darling MA  04/24/24, 08:12 EDT

## 2024-04-25 ENCOUNTER — TREATMENT (OUTPATIENT)
Dept: PHYSICAL THERAPY | Facility: CLINIC | Age: 39
End: 2024-04-25
Payer: MEDICAID

## 2024-04-25 DIAGNOSIS — M54.50 ACUTE MIDLINE LOW BACK PAIN WITHOUT SCIATICA: Primary | ICD-10-CM

## 2024-04-25 DIAGNOSIS — M25.60 JOINT STIFFNESS OF SPINE: ICD-10-CM

## 2024-04-25 DIAGNOSIS — R68.89 ACTIVITY INTOLERANCE: ICD-10-CM

## 2024-04-25 NOTE — PROGRESS NOTES
Physical Therapy Initial Evaluation and Plan [place] [address]    Patient: Carie Troy   : 1985  Diagnosis/ICD-10 Code:  Acute midline low back pain without sciatica [M54.50]  Referring practitioner: RADHA Bacon  Date of Initial Visit: 2024  Today's Date: 2024  Patient seen for 1 sessions           Subjective Questionnaire: Oswestry: 52% limitation      Subjective Evaluation    History of Present Illness  Mechanism of injury: Pt had L4-5 microdiscectomy on 24. Pt returned for follow up with MD 3 days ago and given script for Medrol dose pac as she was having some inflammation due to doing too much activity such as bending and twisting. Not yet taking that dose pack. Pt now to begin PT.   Pt reports she continues to be very busy around the house but has trouble standing very long. Standing tolerance is about 15-20 min.   Pt not employed outside of the home.   Pain is in center of lower lumbar area. No radiating LE pain or paresthesia.     Pt having some difficulty with ADL such as bending over to put shoes/socks on.     Pt also with history of bilateral hand/finger numbness and since surgery these are worse.     Pain  Current pain ratin  At best pain rating: 3  At worst pain rating: 10  Quality: sharp and throbbing  Relieving factors: rest  Aggravating factors: ambulation and squatting    Patient Goals  Patient goals for therapy: increased strength, independence with ADLs/IADLs and decreased pain             Objective          Active Range of Motion     Lumbar   Flexion: 80 degrees with pain  Extension: 10 degrees   Left lateral flexion: 20 degrees with pain  Right lateral flexion: 12 degrees     Strength/Myotome Testing     Lumbar   Left   Normal strength    Right   Normal strength    Lumbar Flexibility Comments:   Pt has good hamstring flexibility    Functional Assessment   Squat   Pain.     Comments  Pt  able to slowly squat down and touch her finger to the floor. Pt guarded and slow with mild discomfort.       See Exercise, Manual, and Modality Logs for complete treatment.     Assessment & Plan       Assessment  Impairments: abnormal gait, abnormal muscle firing, abnormal muscle tone, abnormal or restricted ROM, activity intolerance, impaired balance, impaired physical strength, lacks appropriate home exercise program, pain with function and safety issue   Functional limitations: carrying objects, lifting, sleeping, walking, pulling, pushing, uncomfortable because of pain, moving in bed, sitting, standing, stooping and unable to perform repetitive tasks   Assessment details: Pt presents with back pain and limited tolerance to activity following L4-5 microdiscectomy on 4-5-24 which is affecting their functional status.  Pt has limitations of trunk mobility/motion and trunk stabilization. Pt will benefit from skilled physical therapy to address these limitations.   Prognosis: good    Goals  Plan Goals: LOW BACK PROBLEMS:    1. The patient complains of low back pain.  LTG 1: 8 weeks:  The patient will report a pain rating of 3 or better in order to improve tolerance to activities of daily living and improve sleep quality.  STATUS:  New  STG 1a: 4 weeks:  The patient will report a pain rating of 4 or better.  STATUS:  New    LTG 2:  8 weeks:  Patient will be able to squat down and lift 10# object from floor with smooth technique and no increased pain.   STATUS:  New      3. The patient has activity intolerance.  LTG 3: 8 weeks:  The patient will tolerate standing activity for at least 45 minutes such as grocery shopping with greater ease and minimal pain.  STATUS:  New    4. The patient has limited lumbar AROM  LTG 4: 8 weeks:  The patient will demonstrate lumbar AROM as follows: 90 of flexion and 25 degrees of lateral flexion.  STATUS:  New  STG 4a: 4 weeks: The patient will demonstrate lumbar AROM as follows: 85 of  flexion and 20 degrees of bilateral lateral flexion.  STATUS:  New    5. Mobility: Walking/Moving Around Functional Limitation    LTG 5: 8 weeks:  The patient will demonstrate 30 % limitation as scored on the DORYS.  STATUS:  New  STG 5 a: 4 weeks:  The patient will demonstrate 40 % limitation as scored on the DORYS.    STATUS:  New     Plan  Therapy options: will be seen for skilled therapy services  Planned modality interventions: cryotherapy, electrical stimulation/Russian stimulation, TENS, dry needling and traction  Planned therapy interventions: balance/weight-bearing training, ADL retraining, soft tissue mobilization, strengthening, stretching, therapeutic activities, joint mobilization, home exercise program, functional ROM exercises, flexibility, body mechanics training, postural training, neuromuscular re-education, manual therapy, abdominal trunk stabilization, IADL retraining and spinal/joint mobilization  Frequency: 2x week  Duration in weeks: 12  Treatment plan discussed with: patient        Visit Diagnoses:    ICD-10-CM ICD-9-CM   1. Acute midline low back pain without sciatica  M54.50 724.2   2. Joint stiffness of spine  M25.60 719.58   3. Activity intolerance  R68.89 780.99       History # of Personal Factors and/or Comorbidities: LOW (0)  Examination of Body System(s): # of elements: LOW (1-2)  Clinical Presentation: STABLE   Clinical Decision Making: LOW       Timed:         Manual Therapy:    0     mins  74195;     Therapeutic Exercise:    25     mins  29101;     Neuromuscular Daren:    0    mins  42532;    Therapeutic Activity:     0     mins  72935;     Gait Trainin     mins  25196;     Ultrasound:     0     mins  36460;    Ionto                               0    mins   23297  Self Care                       0     mins   63643  Canalith Repos    0     mins 89886      Un-Timed:  Electrical Stimulation:    0     mins  10355 ( );  Dry Needling     0     mins self-pay  Traction     0      mins 28751  Low Eval     20     Mins  54583  Mod Eval     0     Mins  92065  High Eval                       0     Mins  51507  Re-Eval                           0    mins  35039    Timed Treatment:   25   mins   Total Treatment:     45   mins    PT SIGNATURE: Sabas Zabala PT    Electronically signed 4/25/2024    [place] License: PT - [id number]      Initial Certification  Certification Period: 4/25/2024 thru 7/23/2024  I certify that the therapy services are furnished while this patient is under my care.  The services outlined above are required by this patient, and will be reviewed every 90 days.     PHYSICIAN: Thee Akbar PA  NPI: [id number]      DATE:     Please sign and return via fax to [phone]. Thank you, [facility].

## 2024-04-29 ENCOUNTER — TREATMENT (OUTPATIENT)
Dept: PHYSICAL THERAPY | Facility: CLINIC | Age: 39
End: 2024-04-29
Payer: MEDICAID

## 2024-04-29 ENCOUNTER — OFFICE VISIT (OUTPATIENT)
Dept: FAMILY MEDICINE CLINIC | Facility: CLINIC | Age: 39
End: 2024-04-29
Payer: MEDICAID

## 2024-04-29 DIAGNOSIS — E78.1 HIGH TRIGLYCERIDES: Primary | ICD-10-CM

## 2024-04-29 DIAGNOSIS — M25.60 JOINT STIFFNESS OF SPINE: ICD-10-CM

## 2024-04-29 DIAGNOSIS — R68.89 ACTIVITY INTOLERANCE: ICD-10-CM

## 2024-04-29 DIAGNOSIS — M54.50 ACUTE MIDLINE LOW BACK PAIN WITHOUT SCIATICA: Primary | ICD-10-CM

## 2024-04-29 DIAGNOSIS — R80.9 PROTEINURIA, UNSPECIFIED TYPE: ICD-10-CM

## 2024-04-29 DIAGNOSIS — R73.03 PREDIABETES: ICD-10-CM

## 2024-04-29 PROCEDURE — 97530 THERAPEUTIC ACTIVITIES: CPT

## 2024-04-29 PROCEDURE — 97110 THERAPEUTIC EXERCISES: CPT

## 2024-04-29 NOTE — PROGRESS NOTES
Physical Therapy Daily Treatment Note                        [facility]  [address] [place] [address]    Patient: Carie Troy   : 1985  Diagnosis/ICD-10 Code:  Acute midline low back pain without sciatica [M54.50]  Referring practitioner: RADHA Bacon  Date of Initial Visit: Type: THERAPY  Noted: 2024  Today's Date: 2024  Patient seen for 2 sessions           Subjective     Pt reports having trouble sleeping due to pain last night.  Pt reports she did not do much yesterday that  could have caused increased pain.     Objective   See Exercise, Manual, and Modality Logs for complete treatment.     Assessment/Plan     Pt performed all therapeutic exercises with good technique. Pt continues to have limitations with strength and flexibility. Will continue to gradually increase trunk stabilization and overall activity levels.  Pt will benefit from continued PT to address these limitations.       Timed:  Therapeutic Exercise:    25     mins  46240;     Therapeutic Activity:     13     mins  82049;   Manual Therapy:    0     mins  55307;    Gait Trainin     mins  15691;     Neuromuscular Daren:   0    mins  29883;    Un-timed:  Mechanical Traction      0     mins  03910  Dry Needling     0     mins self-pay  Electrical Stimulation:    0     mins  73045 ( );      Timed Treatment:   38   mins   Total Treatment:     38   mins    Sabas Zabala, PT  Physical Therapist    Electronically signed 2024    [place] License: PT - [id number]

## 2024-05-09 ENCOUNTER — TREATMENT (OUTPATIENT)
Dept: PHYSICAL THERAPY | Facility: CLINIC | Age: 39
End: 2024-05-09
Payer: MEDICAID

## 2024-05-09 ENCOUNTER — TELEPHONE (OUTPATIENT)
Dept: NEUROSURGERY | Facility: CLINIC | Age: 39
End: 2024-05-09
Payer: MEDICAID

## 2024-05-09 DIAGNOSIS — R68.89 ACTIVITY INTOLERANCE: ICD-10-CM

## 2024-05-09 DIAGNOSIS — M54.16 LUMBAR RADICULOPATHY: ICD-10-CM

## 2024-05-09 DIAGNOSIS — M25.60 JOINT STIFFNESS OF SPINE: ICD-10-CM

## 2024-05-09 DIAGNOSIS — M54.50 ACUTE MIDLINE LOW BACK PAIN WITHOUT SCIATICA: Primary | ICD-10-CM

## 2024-05-09 RX ORDER — HYDROCODONE BITARTRATE AND ACETAMINOPHEN 5; 325 MG/1; MG/1
1 TABLET ORAL EVERY 8 HOURS PRN
Qty: 30 TABLET | Refills: 0 | Status: SHIPPED | OUTPATIENT
Start: 2024-05-09

## 2024-05-09 RX ORDER — NICOTINE POLACRILEX 4 MG/1
20 GUM, CHEWING ORAL DAILY
Qty: 30 EACH | Refills: 1 | Status: SHIPPED | OUTPATIENT
Start: 2024-05-09

## 2024-05-10 DIAGNOSIS — M54.16 LUMBAR RADICULOPATHY: ICD-10-CM

## 2024-05-10 RX ORDER — HYDROCODONE BITARTRATE AND ACETAMINOPHEN 5; 325 MG/1; MG/1
1 TABLET ORAL EVERY 8 HOURS PRN
Qty: 30 TABLET | Refills: 0 | OUTPATIENT
Start: 2024-05-10

## 2024-05-13 ENCOUNTER — TREATMENT (OUTPATIENT)
Dept: PHYSICAL THERAPY | Facility: CLINIC | Age: 39
End: 2024-05-13
Payer: MEDICAID

## 2024-05-13 DIAGNOSIS — M54.50 ACUTE MIDLINE LOW BACK PAIN WITHOUT SCIATICA: ICD-10-CM

## 2024-05-13 DIAGNOSIS — R68.89 ACTIVITY INTOLERANCE: Primary | ICD-10-CM

## 2024-05-13 DIAGNOSIS — M25.60 JOINT STIFFNESS OF SPINE: ICD-10-CM

## 2024-05-13 PROCEDURE — 97110 THERAPEUTIC EXERCISES: CPT

## 2024-05-13 NOTE — PROGRESS NOTES
Physical Therapy Daily Treatment Note                        [address] [place] [address] Dr.  [address] [place]. [address]    Patient: Carie Troy   : 1985  Diagnosis/ICD-10 Code:  Activity intolerance [R68.89]  Referring practitioner: RADHA Bacon  Date of Initial Visit: Type: THERAPY  Noted: 2024  Today's Date: 2024  Patient seen for 4 sessions           Subjective   Pt sore today. She was at a cookout yesterday and was up on her feet a lot.   Pt reports feeling of popping in right SI area when lowering leg perform SKTC. Not really painful, just pops.    Objective   See Exercise, Manual, and Modality Logs for complete treatment.     Assessment/Plan     Pt performed all therapeutic exercises with good technique. Pt continues to have limitations with strength and flexibility. Pt will benefit from continued PT to address these limitations.       Timed:  Therapeutic Exercise:    31     mins  43093;     Therapeutic Activity:     0     mins  48997;   Manual Therapy:    0     mins  06031;    Gait Trainin     mins  23684;     Neuromuscular Daren:   0    mins  12062;    Un-timed:  Mechanical Traction      0     mins  41656  Dry Needling     0     mins self-pay  Electrical Stimulation:    0     mins  60779 ( );      Timed Treatment:   31   mins   Total Treatment:     31   mins    Sabas Zabala, PT  Physical Therapist    Electronically signed 2024    [place] License:  PT - [id number]  [place] License: PT - [id number]

## 2024-05-15 DIAGNOSIS — M54.16 LUMBAR RADICULOPATHY: ICD-10-CM

## 2024-05-15 RX ORDER — HYDROCODONE BITARTRATE AND ACETAMINOPHEN 5; 325 MG/1; MG/1
1 TABLET ORAL EVERY 8 HOURS PRN
Qty: 30 TABLET | Refills: 0 | Status: SHIPPED | OUTPATIENT
Start: 2024-05-15

## 2024-05-15 RX ORDER — CYCLOBENZAPRINE HCL 10 MG
10 TABLET ORAL 3 TIMES DAILY PRN
Qty: 30 TABLET | Refills: 0 | Status: SHIPPED | OUTPATIENT
Start: 2024-05-15

## 2024-05-16 ENCOUNTER — TREATMENT (OUTPATIENT)
Dept: PHYSICAL THERAPY | Facility: CLINIC | Age: 39
End: 2024-05-16
Payer: MEDICAID

## 2024-05-16 DIAGNOSIS — M54.50 ACUTE MIDLINE LOW BACK PAIN WITHOUT SCIATICA: ICD-10-CM

## 2024-05-16 DIAGNOSIS — M25.60 JOINT STIFFNESS OF SPINE: ICD-10-CM

## 2024-05-16 DIAGNOSIS — R68.89 ACTIVITY INTOLERANCE: Primary | ICD-10-CM

## 2024-05-16 RX ORDER — MELOXICAM 15 MG/1
15 TABLET ORAL DAILY
OUTPATIENT
Start: 2024-05-16

## 2024-05-16 NOTE — PROGRESS NOTES
Physical Therapy Daily Treatment Note      Patient: Carie Troy   : 1985  Diagnosis/ICD-10 Code:  Activity intolerance [R68.89]  Referring practitioner: RADHA Bacon  Date of Initial Visit: Type: THERAPY  Noted: 2024  Today's Date: 2024  Patient seen for 5 sessions         Carie Troy reports: she woke up with no pain today and states she has been doing better she states if she had to say anything she has and easier time turning to her L than to her R.    Objective   See Exercise, Manual, and Modality Logs for complete treatment.     Assessment/Plan  Pt. completes exercise without pain and shows good tolerance to mobility this date. P.t completes all exercise wih no need for cues to correct form and does a good job to control all activity and movement.    Goals  Plan Goals: LOW BACK PROBLEMS:     1. The patient complains of low back pain.  LTG 1: 8 weeks:  The patient will report a pain rating of 3 or better in order to improve tolerance to activities of daily living and improve sleep quality.  STATUS:  New  STG 1a: 4 weeks:  The patient will report a pain rating of 4 or better.  STATUS:  New     LTG 2:  8 weeks:  Patient will be able to squat down and lift 10# object from floor with smooth technique and no increased pain.   STATUS:  New        3. The patient has activity intolerance.  LTG 3: 8 weeks:  The patient will tolerate standing activity for at least 45 minutes such as grocery shopping with greater ease and minimal pain.  STATUS:  New     4. The patient has limited lumbar AROM  LTG 4: 8 weeks:  The patient will demonstrate lumbar AROM as follows: 90 of flexion and 25 degrees of lateral flexion.  STATUS:  New  STG 4a: 4 weeks: The patient will demonstrate lumbar AROM as follows: 85 of flexion and 20 degrees of bilateral lateral flexion.  STATUS:  New     5. Mobility: Walking/Moving Around Functional Limitation                   LTG 5: 8 weeks:  The patient will demonstrate  30 % limitation as scored on the DORYS.  STATUS:  New  STG 5 a: 4 weeks:  The patient will demonstrate 40 % limitation as scored on the DORYS.    STATUS:  New     Progress strengthening /stabilization /functional activity           Timed:           Therapeutic Exercise:    15     mins  04592;     Therapeutic Activity:     15     mins  20166;       Timed Treatment:   30   mins   Total Treatment:     30   mins    Rupa Mccrary PTA  Physical Therapist Assistant License #[id number]

## 2024-05-20 ENCOUNTER — TREATMENT (OUTPATIENT)
Dept: PHYSICAL THERAPY | Facility: CLINIC | Age: 39
End: 2024-05-20
Payer: MEDICAID

## 2024-05-20 DIAGNOSIS — R68.89 ACTIVITY INTOLERANCE: Primary | ICD-10-CM

## 2024-05-20 DIAGNOSIS — M54.50 ACUTE MIDLINE LOW BACK PAIN WITHOUT SCIATICA: ICD-10-CM

## 2024-05-20 DIAGNOSIS — M25.60 JOINT STIFFNESS OF SPINE: ICD-10-CM

## 2024-05-20 PROCEDURE — 97110 THERAPEUTIC EXERCISES: CPT

## 2024-05-20 NOTE — PROGRESS NOTES
Physical Therapy Daily Treatment Note                        [address] [place] [address] [address] [place] [address]    Patient: Carie Troy   : 1985  Diagnosis/ICD-10 Code:  Activity intolerance [R68.89]  Referring practitioner: RADHA Bacon  Date of Initial Visit: Type: THERAPY  Noted: 2024  Today's Date: 2024  Patient seen for 6 sessions           Subjective     Pt feeling good other than coughing a lot.     Objective   See Exercise, Manual, and Modality Logs for complete treatment.     Assessment/Plan     Pt performed all therapeutic exercises with good technique. Pt continues to have limitations with strength and flexibility. Pt will benefit from continued PT to address these limitations.       Timed:  Therapeutic Exercise:    31     mins  09594;     Therapeutic Activity:     0     mins  26521;   Manual Therapy:    0     mins  68916;    Gait Trainin     mins  93487;     Neuromuscular Daren:   0    mins  61890;    Un-timed:  Mechanical Traction      0     mins  26142  Dry Needling     0     mins self-pay  Electrical Stimulation:    0     mins  50842 ( );      Timed Treatment:   31   mins   Total Treatment:     31   mins    Sabas Zabala, PT  Physical Therapist    Electronically signed 2024    [place] License:  PT - [id number]  [place] License: PT - [id number]

## 2024-05-28 ENCOUNTER — TREATMENT (OUTPATIENT)
Dept: PHYSICAL THERAPY | Facility: CLINIC | Age: 39
End: 2024-05-28
Payer: MEDICAID

## 2024-05-28 DIAGNOSIS — M54.16 LUMBAR RADICULOPATHY: ICD-10-CM

## 2024-05-28 DIAGNOSIS — M25.60 JOINT STIFFNESS OF SPINE: ICD-10-CM

## 2024-05-28 DIAGNOSIS — N39.3 SUI (STRESS URINARY INCONTINENCE, FEMALE): ICD-10-CM

## 2024-05-28 DIAGNOSIS — R68.89 ACTIVITY INTOLERANCE: Primary | ICD-10-CM

## 2024-05-28 DIAGNOSIS — K58.2 IRRITABLE BOWEL SYNDROME WITH BOTH CONSTIPATION AND DIARRHEA: ICD-10-CM

## 2024-05-28 DIAGNOSIS — F39 MOOD DISORDER: ICD-10-CM

## 2024-05-28 DIAGNOSIS — F32.A DEPRESSION, UNSPECIFIED DEPRESSION TYPE: ICD-10-CM

## 2024-05-28 DIAGNOSIS — M54.50 ACUTE MIDLINE LOW BACK PAIN WITHOUT SCIATICA: ICD-10-CM

## 2024-05-28 PROCEDURE — 97110 THERAPEUTIC EXERCISES: CPT

## 2024-05-28 RX ORDER — DIPHENHYDRAMINE HCL 25 MG
25 CAPSULE ORAL EVERY 6 HOURS PRN
OUTPATIENT
Start: 2024-05-28

## 2024-05-28 RX ORDER — DICYCLOMINE HYDROCHLORIDE 10 MG/1
10 CAPSULE ORAL
Qty: 120 CAPSULE | Refills: 0 | OUTPATIENT
Start: 2024-05-28

## 2024-05-28 RX ORDER — OXYBUTYNIN CHLORIDE 5 MG/1
5 TABLET, EXTENDED RELEASE ORAL DAILY
Qty: 30 TABLET | Refills: 11 | OUTPATIENT
Start: 2024-05-28

## 2024-05-28 RX ORDER — DICYCLOMINE HYDROCHLORIDE 10 MG/1
CAPSULE ORAL
Qty: 120 CAPSULE | Refills: 0 | OUTPATIENT
Start: 2024-05-28

## 2024-05-28 RX ORDER — CYCLOBENZAPRINE HCL 10 MG
10 TABLET ORAL 3 TIMES DAILY PRN
Qty: 30 TABLET | Refills: 0 | OUTPATIENT
Start: 2024-05-28

## 2024-05-28 NOTE — PROGRESS NOTES
Physical Therapy Daily Treatment Note                        [address] [place] [address] [address]  [address] [place]. [address]    Patient: Carie Troy   : 1985  Diagnosis/ICD-10 Code:  Activity intolerance [R68.89]  Referring practitioner: RADHA Bacon  Date of Initial Visit: Type: THERAPY  Noted: 2024  Today's Date: 2024  Patient seen for 7 sessions           Subjective     Pt is sore today. She was pretty busy over the weekend.     Objective   See Exercise, Manual, and Modality Logs for complete treatment.     Assessment/Plan     Pt performed all therapeutic exercises with good technique. Pt continues to have limitations with strength and flexibility. Pt will benefit from continued PT to address these limitations.       Timed:  Therapeutic Exercise:    30     mins  86019;     Therapeutic Activity:     0     mins  65416;   Manual Therapy:    0     mins  64281;    Gait Trainin     mins  09670;     Neuromuscular Daren:   0    mins  23651;  Ultrasound:                    0     mins  27110    Un-timed:  Mechanical Traction      0     mins  02564  Dry Needling     0     mins self-pay  Electrical Stimulation:    0     mins  92781 ( );      Timed Treatment:   30   mins   Total Treatment:     30   mins    Sabas Zabala PT  Physical Therapist    Electronically signed 2024    [place] License:  PT - [id number]  [place] License: PT - [id number]

## 2024-05-30 RX ORDER — HYDROCODONE BITARTRATE AND ACETAMINOPHEN 5; 325 MG/1; MG/1
1 TABLET ORAL EVERY 8 HOURS PRN
Qty: 30 TABLET | Refills: 0 | Status: SHIPPED | OUTPATIENT
Start: 2024-05-30

## 2024-05-30 RX ORDER — CYCLOBENZAPRINE HCL 10 MG
10 TABLET ORAL 3 TIMES DAILY PRN
Qty: 30 TABLET | Refills: 0 | Status: SHIPPED | OUTPATIENT
Start: 2024-05-30

## 2024-06-01 DIAGNOSIS — K58.2 IRRITABLE BOWEL SYNDROME WITH BOTH CONSTIPATION AND DIARRHEA: ICD-10-CM

## 2024-06-03 ENCOUNTER — TELEPHONE (OUTPATIENT)
Dept: NEUROSURGERY | Facility: CLINIC | Age: 39
End: 2024-06-03
Payer: MEDICAID

## 2024-06-03 RX ORDER — DICYCLOMINE HYDROCHLORIDE 10 MG/1
CAPSULE ORAL
Qty: 120 CAPSULE | Refills: 0 | Status: SHIPPED | OUTPATIENT
Start: 2024-06-03

## 2024-06-03 NOTE — TELEPHONE ENCOUNTER
Patient called about a PA on her [place]. I told her we will get the PA submitted and then call her once we receive the authorization. She verbalized understanding.    **PA Submitted via Covermymeds**

## 2024-06-06 NOTE — TELEPHONE ENCOUNTER
Called and LVM to call the office. I did leave a message that her medication was approved. (Ok Per  Verbal Release)     This will be her last refill on her pain RX as she is 2 months out from surgery.

## 2024-06-07 DIAGNOSIS — R05.3 CHRONIC COUGH: ICD-10-CM

## 2024-06-07 DIAGNOSIS — M54.16 LUMBAR RADICULOPATHY: ICD-10-CM

## 2024-06-07 NOTE — PROGRESS NOTES
"Subjective   History of Present Illness: Carie Troy is a 38 y.o. female is here today for surgical follow-up.  Patient says her leg pain and some of her back pain is significantly improved.  She continues to have pain in her low back especially while standing for long period of time.    Chief Complaint   Patient presents with    Post-op          Previous treatment: Norco, Flexeril, PO PT    Previous neurosurgery:  4/5/2024 Right lumbar 4 5 microdiscectomy    Previous injections:     The following portions of the patient's history were reviewed and updated as appropriate: allergies, current medications, past family history, past medical history, past social history, past surgical history, and problem list.    Review of Systems   Constitutional:  Positive for activity change.   HENT: Negative.     Eyes: Negative.    Respiratory: Negative.     Cardiovascular: Negative.    Gastrointestinal: Negative.    Endocrine: Negative.    Genitourinary: Negative.    Musculoskeletal:  Positive for arthralgias, back pain and myalgias.   Skin:         Looks good    Allergic/Immunologic: Negative.    Neurological:  Positive for numbness (+tingling middle finger on right hand).        Dull pain      Hematological: Negative.    Psychiatric/Behavioral:  Positive for sleep disturbance (not often).        Objective      /82   Pulse 79   Resp 18   Ht 154.9 cm (61\")   Wt 104 kg (230 lb)   SpO2 97%   BMI 43.46 kg/m²    Body mass index is 43.46 kg/m².  Vitals:    06/10/24 0933   PainSc:   4   PainLoc: Back           Neurologic Exam    Assessment & Plan   Independent Review of Radiographic Studies:      I personally reviewed and interpreted the images from the following studies.    No new imaging    Medical Decision Making:      Carie Troy is a 38 y.o. female status post microdiscectomy overall doing well at 3 months postop.  Patient has no limitations from my perspective and can ramp up to normal activity.  She continues " to have some degree of back pain, which may or may not improve over the course of time.  She will need to follow-up with her primary care doctor or pain management for any further pain medication.      Diagnoses and all orders for this visit:    1. Lumbar degenerative disc disease (Primary)      No follow-ups on file.    This patient was examined wearing appropriate personal protective equipment.                      Dr. Primitivo Gutierrez IV    06/10/24  09:45 EDT

## 2024-06-10 ENCOUNTER — OFFICE VISIT (OUTPATIENT)
Dept: NEUROSURGERY | Facility: CLINIC | Age: 39
End: 2024-06-10
Payer: MEDICAID

## 2024-06-10 VITALS
DIASTOLIC BLOOD PRESSURE: 82 MMHG | WEIGHT: 230 LBS | RESPIRATION RATE: 18 BRPM | OXYGEN SATURATION: 97 % | SYSTOLIC BLOOD PRESSURE: 128 MMHG | BODY MASS INDEX: 43.43 KG/M2 | HEART RATE: 79 BPM | HEIGHT: 61 IN

## 2024-06-10 DIAGNOSIS — M54.16 LUMBAR RADICULOPATHY: ICD-10-CM

## 2024-06-10 DIAGNOSIS — M51.36 LUMBAR DEGENERATIVE DISC DISEASE: Primary | ICD-10-CM

## 2024-06-10 DIAGNOSIS — R05.3 CHRONIC COUGH: ICD-10-CM

## 2024-06-10 PROCEDURE — 1160F RVW MEDS BY RX/DR IN RCRD: CPT | Performed by: NEUROLOGICAL SURGERY

## 2024-06-10 PROCEDURE — 99024 POSTOP FOLLOW-UP VISIT: CPT | Performed by: NEUROLOGICAL SURGERY

## 2024-06-10 PROCEDURE — 1159F MED LIST DOCD IN RCRD: CPT | Performed by: NEUROLOGICAL SURGERY

## 2024-06-10 RX ORDER — CYCLOBENZAPRINE HCL 10 MG
10 TABLET ORAL 3 TIMES DAILY PRN
Qty: 30 TABLET | Refills: 0 | Status: SHIPPED | OUTPATIENT
Start: 2024-06-10

## 2024-06-10 RX ORDER — NICOTINE POLACRILEX 4 MG/1
20 GUM, CHEWING ORAL DAILY
Qty: 30 EACH | Refills: 1 | Status: SHIPPED | OUTPATIENT
Start: 2024-06-10

## 2024-06-10 RX ORDER — ALBUTEROL SULFATE 90 UG/1
2 AEROSOL, METERED RESPIRATORY (INHALATION) EVERY 4 HOURS PRN
Qty: 9 G | Refills: 0 | Status: SHIPPED | OUTPATIENT
Start: 2024-06-10

## 2024-06-10 RX ORDER — FLUTICASONE PROPIONATE AND SALMETEROL 250; 50 UG/1; UG/1
1 POWDER RESPIRATORY (INHALATION) 2 TIMES DAILY
Qty: 60 EACH | Refills: 0 | Status: SHIPPED | OUTPATIENT
Start: 2024-06-10

## 2024-06-10 RX ORDER — HYDROCODONE BITARTRATE AND ACETAMINOPHEN 5; 325 MG/1; MG/1
1 TABLET ORAL EVERY 8 HOURS PRN
Qty: 30 TABLET | Refills: 0 | OUTPATIENT
Start: 2024-06-10

## 2024-06-10 RX ORDER — ALBUTEROL SULFATE 90 UG/1
2 AEROSOL, METERED RESPIRATORY (INHALATION) EVERY 4 HOURS PRN
Qty: 9 G | Refills: 0 | OUTPATIENT
Start: 2024-06-10

## 2024-06-10 RX ORDER — FLUTICASONE PROPIONATE AND SALMETEROL 250; 50 UG/1; UG/1
1 POWDER RESPIRATORY (INHALATION) 2 TIMES DAILY
Qty: 60 EACH | Refills: 0 | OUTPATIENT
Start: 2024-06-10

## 2024-06-17 DIAGNOSIS — M54.16 LUMBAR RADICULOPATHY: ICD-10-CM

## 2024-06-20 RX ORDER — HYDROCODONE BITARTRATE AND ACETAMINOPHEN 5; 325 MG/1; MG/1
1 TABLET ORAL EVERY 8 HOURS PRN
Qty: 30 TABLET | Refills: 0 | Status: SHIPPED | OUTPATIENT
Start: 2024-06-20

## 2024-06-20 RX ORDER — CYCLOBENZAPRINE HCL 10 MG
10 TABLET ORAL 3 TIMES DAILY PRN
Qty: 30 TABLET | Refills: 0 | Status: SHIPPED | OUTPATIENT
Start: 2024-06-20

## 2024-06-20 NOTE — TELEPHONE ENCOUNTER
Rx Refill Note  Requested Prescriptions     Pending Prescriptions Disp Refills    HYDROcodone-acetaminophen (Norco) 5-325 MG per tablet 30 tablet 0     Sig: Take 1 tablet by mouth Every 8 (Eight) Hours As Needed for Moderate Pain.    cyclobenzaprine (FLEXERIL) 10 MG tablet 30 tablet 0     Sig: Take 1 tablet by mouth 3 (Three) Times a Day As Needed for Muscle Spasms.      Last office visit with prescribing clinician: 6/10/2024   Last telemedicine visit with prescribing clinician: Visit date not found   Next office visit with prescribing clinician: 7/1/2024                         Would you like a call back once the refill request has been completed: [] Yes [] No    If the office needs to give you a call back, can they leave a voicemail: [] Yes [] No    Love Darling MA  06/20/24, 07:49 EDT

## 2024-06-21 ENCOUNTER — TELEPHONE (OUTPATIENT)
Dept: NEUROSURGERY | Facility: CLINIC | Age: 39
End: 2024-06-21
Payer: MEDICAID

## 2024-06-21 NOTE — TELEPHONE ENCOUNTER
Caller: Carie Troy    Relationship to patient: Self    Best call back number: [phone]    Chief complaint: PT STATES SHE IS HEALING FINE, SHE STATES SHE ONLY HAS PAIN IF SHE OVER DOES HERSELF.     Type of visit: POST OP    Requested date: N/A     If rescheduling, when is the original appointment: 7-1-24     Additional notes:PT FEELS LIKE SHE DOES NOT NEED APPT AND WOULD LIKE TO CANCEL. IF DR MOJICA WANTS TO SEE THE PT THEN SHE NEEDS TO RESCHEDULE.     PLEASE CALL PT TO ADVISE.     THANK YOU,

## 2024-06-30 DIAGNOSIS — M54.16 LUMBAR RADICULOPATHY: ICD-10-CM

## 2024-06-30 DIAGNOSIS — K58.2 IRRITABLE BOWEL SYNDROME WITH BOTH CONSTIPATION AND DIARRHEA: ICD-10-CM

## 2024-07-01 RX ORDER — NICOTINE POLACRILEX 4 MG/1
20 GUM, CHEWING ORAL DAILY
Qty: 30 EACH | Refills: 1 | OUTPATIENT
Start: 2024-07-01

## 2024-07-01 RX ORDER — DICYCLOMINE HYDROCHLORIDE 10 MG/1
CAPSULE ORAL
Qty: 120 CAPSULE | Refills: 0 | OUTPATIENT
Start: 2024-07-01

## 2024-07-01 RX ORDER — DIPHENHYDRAMINE HCL 25 MG
25 CAPSULE ORAL EVERY 6 HOURS PRN
OUTPATIENT
Start: 2024-07-01

## 2024-07-01 NOTE — TELEPHONE ENCOUNTER
Same-day cancel 5/28/2024  No-show 4/29/2024  Appointment canceled 4/16/2024  Appointment canceled 3/29/2024  No-show appointment 3/21/2024  Appointment canceled 2/29/2024  Last visit seen 2/6/2024

## 2024-07-02 ENCOUNTER — TELEPHONE (OUTPATIENT)
Dept: FAMILY MEDICINE CLINIC | Facility: CLINIC | Age: 39
End: 2024-07-02
Payer: MEDICAID

## 2024-07-02 RX ORDER — HYDROCODONE BITARTRATE AND ACETAMINOPHEN 5; 325 MG/1; MG/1
1 TABLET ORAL EVERY 8 HOURS PRN
Qty: 30 TABLET | Refills: 0 | OUTPATIENT
Start: 2024-07-02

## 2024-07-03 RX ORDER — CYCLOBENZAPRINE HCL 10 MG
10 TABLET ORAL 3 TIMES DAILY PRN
Qty: 30 TABLET | Refills: 0 | OUTPATIENT
Start: 2024-07-03

## 2024-07-11 ENCOUNTER — TELEPHONE (OUTPATIENT)
Dept: FAMILY MEDICINE CLINIC | Facility: CLINIC | Age: 39
End: 2024-07-11
Payer: MEDICAID

## 2024-07-11 NOTE — TELEPHONE ENCOUNTER
I recommend that she stop taking all medication other than sertraline and make an appointment with an OB/GYN to establish care and for further advice.    She recently requested narcotic pain medication as well as muscle relaxer refills which should not be taken.  Avoid all NSAIDs including ibuprofen, Motrin, Advil, Aleve.  The only safe medication for pain is acetaminophen 500 mg every 8 hours as needed.

## 2024-07-11 NOTE — TELEPHONE ENCOUNTER
Caller: Carie Troy    Relationship: Self    Best call back number: [phone]     What is the best time to reach you: ANYTIME    Who are you requesting to speak with (clinical staff, provider,  specific staff member): CLINICAL    What was the call regarding: SHOULD SHE STOP TAKING MEDICATIONS DUE TO BEING PREGNANT.     PLEASE CALL TO ADVISE.     Is it okay if the provider responds through MyChart: PHONE CALL PLEASE.

## 2024-07-12 NOTE — TELEPHONE ENCOUNTER
Called patient and advise and she wanted to know about the albuterol Inhaler. I personally asked chad and she said no the inhaler was okay to continue as well.

## 2024-08-14 NOTE — PROGRESS NOTES
Office Note     Name: Carie Troy    : 1985     MRN: [id number]     Chief Complaint  Earache (Right ear pain, painful and drainage)    Subjective     History of Present Illness:  Carie Troy is a 38 y.o. female who presents today for Ear pain for month there is drainage and severe pain that is keep her up at night.   Ear Drainage   There is pain in the right ear. This is a new problem. The current episode started 1 to 4 weeks ago. The problem occurs constantly. The problem has been unchanged. She has tried nothing for the symptoms. The treatment provided no relief.       She is currently 15 weeks gestation pregnant.  She sees Dr. Babcock. Due Date 2025.     For nasal congestion she can use Sudafed, Afrin spray or Ocean Spray.  Seasonal allergies she can take Claritin or Benadryl.  For headache she can take Tylenol as needed.  History of Present Illness  The patient is a 38-year-old female who presents for evaluation of ear pain. She is currently pregnant.    She is approximately 15 weeks into her pregnancy and is under the care of Dr. Babcock, an OB-GYN specialist. Her expected due date is in 2025. She has not yet felt any fetal movements and is expecting a boy. She is aware that ibuprofen should be avoided during pregnancy.    She has been experiencing bilateral ear pain for about a month. The pain has slightly subsided in her left ear but persists in her right ear. It is mild during the day but intensifies at night.    She has known allergies to dogs and mold but is not currently taking any medication for these allergies. Her current medications include albuterol, Advair, omeprazole, and sertraline.    She has reduced her smoking habit to about a quarter to half a pack per day. Both her  and mother-in-law smoke indoors.    Her back is doing well.    ALLERGIES  She is allergic to DOGS and MOLD.    Allergies   Allergen Reactions    Gabapentin Other (See Comments)     Makes very  tired    Ibuprofen Rash    Tylenol [Acetaminophen] Rash         Current Outpatient Medications:     albuterol sulfate  (90 Base) MCG/ACT inhaler, Inhale 2 puffs Every 4 (Four) Hours As Needed for Wheezing., Disp: 9 g, Rfl: 0    Fluticasone-Salmeterol (ADVAIR/WIXELA) 250-50 MCG/ACT DISKUS, Inhale 1 puff 2 (Two) Times a Day., Disp: 60 each, Rfl: 0    Omeprazole 20 MG tablet delayed-release, Take 20 mg by mouth Daily., Disp: 30 each, Rfl: 1    sertraline (ZOLOFT) 50 MG tablet, Take 1 tablet by mouth Daily., Disp: 30 tablet, Rfl: 11    amoxicillin (AMOXIL) 500 MG tablet, Take 1 tablet by mouth 3 (Three) Times a Day., Disp: 30 tablet, Rfl: 0    meloxicam (MOBIC) 15 MG tablet, Take 1 tablet by mouth Daily. (Patient not taking: Reported on 8/15/2024), Disp: , Rfl:     Review of Systems   Neurological:  Positive for dizziness.   All other systems reviewed and are negative.      Social History     Socioeconomic History    Marital status:    Tobacco Use    Smoking status: Every Day     Current packs/day: 0.50     Average packs/day: 0.5 packs/day for 18.0 years (9.0 ttl pk-yrs)     Types: Cigarettes    Smokeless tobacco: Never   Vaping Use    Vaping status: Some Days    Substances: Nicotine   Substance and Sexual Activity    Alcohol use: Yes     Comment: for hoildays     Drug use: Yes     Types: Marijuana    Sexual activity: Yes     Partners: Male     Comment:         Family History   Adopted: Yes   Problem Relation Age of Onset    Hypertension Mother            3/29/2024     9:48 AM   PHQ-2/PHQ-9 Depression Screening   Little Interest or Pleasure in Doing Things 0-->not at all   Feeling Down, Depressed or Hopeless 0-->not at all   PHQ-9: Brief Depression Severity Measure Score 0       Fall Risk Screen:  YU Fall Risk Assessment has not been completed.      Objective     /56 (BP Location: Right arm, Patient Position: Sitting, Cuff Size: Large Adult)   Pulse 75   Temp 97.3 °F (36.3 °C) (Temporal)  "  Resp 18   Ht 154.9 cm (60.98\")   Wt 96.7 kg (213 lb 3.2 oz)   SpO2 95%   BMI 40.30 kg/m²     BP Readings from Last 2 Encounters:   08/15/24 106/56   06/10/24 128/82       Wt Readings from Last 2 Encounters:   08/15/24 96.7 kg (213 lb 3.2 oz)   06/10/24 104 kg (230 lb)                Physical Exam  Derm Physical Exam  Physical Exam      Result Review :       Results      Assessment and Plan     Plan    Diagnoses and all orders for this visit:    1. Tobacco abuse (Primary)  Comments:  1/4 - 1/2 ppd.    2. Middle ear effusion, bilateral    3. Second hand smoke exposure    4. Seasonal allergies    5. 15 weeks gestation of pregnancy    Other orders  -     amoxicillin (AMOXIL) 500 MG tablet; Take 1 tablet by mouth 3 (Three) Times a Day.  Dispense: 30 tablet; Refill: 0       Assessment & Plan  1. Bilateral ear pain.  The bilateral ear pain is likely related to allergies, as she has discontinued her allergy medication. Sinus drainage may be contributing to the discomfort. A prescription for amoxicillin, 500 mg every 8 hours for 10 days, will be sent to [facility]. Over-the-counter remedies such as Ocean spray, Afrin, and Claritin (not Claritin-D) can be used intermittently for congestion relief. She is advised to sleep with her head elevated to facilitate sinus drainage.    2. Pregnancy.  She is currently 15 weeks pregnant and under the care of Dr. Babcock. She is advised to avoid ibuprofen and can take Tylenol for pain relief. Dr. Babcock has approved the continued use of sertraline and omeprazole during her pregnancy. She has a follow-up appointment with Dr. Babcock on September 3, 2024.    3. Smoking.  She reports smoking between a fourth to half a pack of cigarettes daily. She is encouraged to stop smoking and to discuss with her  and mother-in-law about smoking outside the house to reduce exposure to secondhand smoke, especially with the upcoming arrival of the baby.         Follow Up   Wrapup Tab  Return for Next " scheduled follow up.     Patient was given instructions and counseling regarding her condition or for health maintenance advice. Please see specific information pulled into the AVS if appropriate.  Hand hygiene was performed during entrance to exam room and following assessment of patient. This document is intended for medical expert use only.       SHAREE Ordaz, FNP-C  [address]  [facility]  [address] [place] [address] DR CINDY ROTH [address] [place] [id number] [phone]    Patient or patient representative verbalized consent for the use of Ambient Listening during the visit with  SHAREE Ordaz for chart documentation. 8/15/2024  18:11 EDT

## 2024-08-15 ENCOUNTER — OFFICE VISIT (OUTPATIENT)
Dept: FAMILY MEDICINE CLINIC | Facility: CLINIC | Age: 39
End: 2024-08-15
Payer: MEDICAID

## 2024-08-15 VITALS
WEIGHT: 213.2 LBS | DIASTOLIC BLOOD PRESSURE: 56 MMHG | HEIGHT: 61 IN | BODY MASS INDEX: 40.25 KG/M2 | HEART RATE: 75 BPM | TEMPERATURE: 97.3 F | RESPIRATION RATE: 18 BRPM | SYSTOLIC BLOOD PRESSURE: 106 MMHG | OXYGEN SATURATION: 95 %

## 2024-08-15 DIAGNOSIS — Z72.0 TOBACCO ABUSE: Primary | ICD-10-CM

## 2024-08-15 DIAGNOSIS — J30.2 SEASONAL ALLERGIES: ICD-10-CM

## 2024-08-15 DIAGNOSIS — Z3A.15 15 WEEKS GESTATION OF PREGNANCY: ICD-10-CM

## 2024-08-15 DIAGNOSIS — Z77.22 SECOND HAND SMOKE EXPOSURE: ICD-10-CM

## 2024-08-15 DIAGNOSIS — H65.93 MIDDLE EAR EFFUSION, BILATERAL: ICD-10-CM

## 2024-08-15 RX ORDER — AMOXICILLIN 500 MG/1
500 TABLET, FILM COATED ORAL 3 TIMES DAILY
Qty: 30 TABLET | Refills: 0 | Status: SHIPPED | OUTPATIENT
Start: 2024-08-15

## 2024-10-08 ENCOUNTER — DOCUMENTATION (OUTPATIENT)
Dept: PHYSICAL THERAPY | Facility: CLINIC | Age: 39
End: 2024-10-08
Payer: MEDICAID

## 2024-10-08 NOTE — PROGRESS NOTES
Discharge Summary  Discharge Summary from Physical Therapy Report      Dates  PT visit: April 25 - May 28, 2024  Number of Visits: 8     Discharge Status of Patient: See note dated May 28th    Goals: Partially Met    Discharge Plan: Continue with current home exercise program as instructed    Comments Pt had one remaining appt that she did not attend. She did not call to re-schedule     Date of Discharge 10/8/2024        Sabas Zabala, PT  Physical Therapist

## 2024-11-19 RX ORDER — NICOTINE POLACRILEX 4 MG/1
20 GUM, CHEWING ORAL DAILY
Qty: 30 EACH | Refills: 1 | OUTPATIENT
Start: 2024-11-19

## 2024-11-19 NOTE — TELEPHONE ENCOUNTER
Caller: Carie Troy    Relationship: Self    Best call back number: [phone]    Requested Prescriptions:   Requested Prescriptions     Pending Prescriptions Disp Refills    Omeprazole 20 MG tablet delayed-release 30 each 1     Sig: Take 20 mg by mouth Daily.        Pharmacy where request should be sent: [facility] [address] [place] [address]  [address] - [phone]  - [phone]     Last office visit with prescribing clinician: 8/15/2024   Last telemedicine visit with prescribing clinician: Visit date not found   Next office visit with prescribing clinician: Visit date not found     Additional details provided by patient:     Does the patient have less than a 3 day supply:  [x] Yes  [] No    Would you like a call back once the refill request has been completed: [] Yes [] No    If the office needs to give you a call back, can they leave a voicemail: [] Yes [] No    Viky Foote Rep   11/19/24 14:21 EST

## 2024-11-25 NOTE — TELEPHONE ENCOUNTER
Caller: Leti Troylina GOODWIN    Relationship: Self    Best call back number: [phone]    Requested Prescriptions:   Requested Prescriptions     Pending Prescriptions Disp Refills    Omeprazole 20 MG tablet delayed-release 30 each 1     Sig: Take 20 mg by mouth Daily.        Pharmacy where request should be sent: [facility] [address] [place] [address] [address]  [address] - [phone]  - [phone] FX     Last office visit with prescribing clinician: 8/15/2024   Last telemedicine visit with prescribing clinician: Visit date not found   Next office visit with prescribing clinician: Visit date not found     Additional details provided by patient:     PATIENT RAN OUT OF HER MEDICATION LAST WEEK       Monica Edwards, YESENIA   11/25/24 12:02 EST

## 2024-11-26 RX ORDER — NICOTINE POLACRILEX 4 MG/1
20 GUM, CHEWING ORAL DAILY
Qty: 30 EACH | Refills: 1 | OUTPATIENT
Start: 2024-11-26

## 2024-12-02 RX ORDER — NICOTINE POLACRILEX 4 MG/1
1 GUM, CHEWING ORAL DAILY
Refills: 0 | OUTPATIENT
Start: 2024-12-02

## 2024-12-08 DIAGNOSIS — F39 MOOD DISORDER: ICD-10-CM

## 2024-12-08 DIAGNOSIS — F32.A DEPRESSION, UNSPECIFIED DEPRESSION TYPE: ICD-10-CM

## 2024-12-27 DIAGNOSIS — F32.A DEPRESSION, UNSPECIFIED DEPRESSION TYPE: ICD-10-CM

## 2024-12-27 DIAGNOSIS — F39 MOOD DISORDER: ICD-10-CM

## 2025-01-28 DIAGNOSIS — F39 MOOD DISORDER: ICD-10-CM

## 2025-01-28 DIAGNOSIS — F32.A DEPRESSION, UNSPECIFIED DEPRESSION TYPE: ICD-10-CM

## 2025-02-04 ENCOUNTER — OFFICE VISIT (OUTPATIENT)
Dept: FAMILY MEDICINE CLINIC | Facility: CLINIC | Age: 40
End: 2025-02-04
Payer: MEDICAID

## 2025-02-04 VITALS
DIASTOLIC BLOOD PRESSURE: 86 MMHG | WEIGHT: 212.6 LBS | SYSTOLIC BLOOD PRESSURE: 122 MMHG | HEIGHT: 61 IN | HEART RATE: 59 BPM | TEMPERATURE: 97 F | OXYGEN SATURATION: 97 % | BODY MASS INDEX: 40.14 KG/M2 | RESPIRATION RATE: 18 BRPM

## 2025-02-04 DIAGNOSIS — R73.03 PREDIABETES: ICD-10-CM

## 2025-02-04 DIAGNOSIS — F39 MOOD DISORDER: ICD-10-CM

## 2025-02-04 DIAGNOSIS — Z98.891 H/O CESAREAN SECTION: Primary | ICD-10-CM

## 2025-02-04 DIAGNOSIS — E78.1 HIGH TRIGLYCERIDES: ICD-10-CM

## 2025-02-04 DIAGNOSIS — F32.A DEPRESSION, UNSPECIFIED DEPRESSION TYPE: ICD-10-CM

## 2025-02-04 DIAGNOSIS — K52.9 POSTPRANDIAL DIARRHEA: ICD-10-CM

## 2025-02-04 DIAGNOSIS — K58.2 IRRITABLE BOWEL SYNDROME WITH BOTH CONSTIPATION AND DIARRHEA: ICD-10-CM

## 2025-02-04 PROBLEM — J06.9 UPPER RESPIRATORY TRACT INFECTION: Status: RESOLVED | Noted: 2024-02-06 | Resolved: 2025-02-04

## 2025-02-04 PROBLEM — L50.1 URTICARIA, IDIOPATHIC: Status: RESOLVED | Noted: 2022-06-14 | Resolved: 2025-02-04

## 2025-02-04 PROBLEM — Z3A.15 15 WEEKS GESTATION OF PREGNANCY: Status: RESOLVED | Noted: 2024-08-15 | Resolved: 2025-02-04

## 2025-02-04 PROBLEM — Z86.19 HISTORY OF SYPHILIS: Status: ACTIVE | Noted: 2025-02-04

## 2025-02-04 PROBLEM — Z31.9 PATIENT DESIRES PREGNANCY: Status: RESOLVED | Noted: 2023-06-01 | Resolved: 2025-02-04

## 2025-02-04 PROBLEM — J30.2 SEASONAL ALLERGIES: Status: RESOLVED | Noted: 2024-03-29 | Resolved: 2025-02-04

## 2025-02-04 PROBLEM — J35.1 ENLARGED TONSILS: Status: RESOLVED | Noted: 2024-02-06 | Resolved: 2025-02-04

## 2025-02-04 PROCEDURE — 99214 OFFICE O/P EST MOD 30 MIN: CPT

## 2025-02-04 PROCEDURE — 1125F AMNT PAIN NOTED PAIN PRSNT: CPT

## 2025-02-04 RX ORDER — NICOTINE POLACRILEX 4 MG/1
20 GUM, CHEWING ORAL DAILY
Qty: 30 EACH | Refills: 1 | Status: SHIPPED | OUTPATIENT
Start: 2025-02-04

## 2025-02-04 RX ORDER — PSEUDOEPHEDRINE HCL 30 MG
100 TABLET ORAL
COMMUNITY
Start: 2025-01-25

## 2025-02-04 RX ORDER — DICYCLOMINE HYDROCHLORIDE 10 MG/1
10 CAPSULE ORAL
Qty: 120 CAPSULE | Refills: 0 | Status: SHIPPED | OUTPATIENT
Start: 2025-02-04

## 2025-02-04 RX ORDER — FERROUS SULFATE 325(65) MG
TABLET ORAL
COMMUNITY
Start: 2024-12-14

## 2025-02-04 NOTE — ASSESSMENT & PLAN NOTE
Lipid abnormalities are stable    Plan:  Check LFT.      Counseled patient on lifestyle modifications to help control hyperlipidemia.   Weight Loss encouraged    Patient Treatment Goals:   LDL goal is less than 70  LDL goal is under 100    Followup at the next regular appointment.    Orders:    Lipid Panel With LDL / HDL Ratio    CBC & Differential

## 2025-02-04 NOTE — ASSESSMENT & PLAN NOTE
She is currently on sertraline 50 mg, which appears to be effective. No thoughts of self-harm or harm to others were reported.    Orders:    sertraline (ZOLOFT) 50 MG tablet; Take 1 tablet by mouth Daily.

## 2025-02-04 NOTE — PROGRESS NOTES
Office Note     Name: Carie Troy    : 1985     MRN: [id number]     Chief Complaint  Med Refill    Subjective     History of Present Illness:  Carie Troy is a 39 y.o. female who presents today for Medication follow up, zoloft, omeprazole, Albuterol Inhaler. Would like to get on medication for IBS    History of Present Illness    C section 25 Rice and tubal.   Ham 6lb 4.5 oz. Apgar 8/9.          History of Present Illness  The patient is a 39-year-old female who presents for follow-up after having been placed on medication for her irritable bowel syndrome, postpartum status post , depression, and elevated blood pressure.    She reports satisfactory bowel movements with the use of a stool softener. She has not experienced any constipation or diarrhea. Dicyclomine was effective, but she experienced increased frequency of bowel movements regardless of dietary intake. She reports no nausea, vomiting, hematochezia, hematuria, or abdominal pain unrelated to her .    She underwent a  and tubal ligation, with no significant blood loss reported. Her incision is healing well, although she experienced a transient painful knot, which she attributes to a pulled muscle during staple removal. She continues to apply mupirocin to her abdomen. She is not currently taking prenatal vitamins or iron supplements. She was prescribed oxycodone upon discharge but has since discontinued its use. She received Lovenox injections postoperatively. She is not currently pregnant and has not yet resumed menstruation. She is not breastfeeding.    She is currently on sertraline 50 mg, which she reports as beneficial. She does not endorse any suicidal or homicidal ideations. She reports managing her stress levels effectively.    She is seeking advice regarding her blood pressure medication. She was informed of a slightly elevated diastolic blood pressure reading and experienced hypertension  during her hospital stay.    Supplemental Information  She is on omeprazole for heartburn. She is not taking buspirone. She had influenza in December 2024 and was treated with Tamiflu. She is on albuterol inhaler.    MEDICATIONS  Current: omeprazole, sertraline, albuterol inhaler, Colace, mupirocin  Discontinued: buspirone, hydrocodone, oxycodone, Tamiflu    Allergies   Allergen Reactions    Gabapentin Other (See Comments)     Makes very tired    Ibuprofen Rash    Tylenol [Acetaminophen] Rash         Current Outpatient Medications:     albuterol sulfate  (90 Base) MCG/ACT inhaler, Inhale 2 puffs Every 4 (Four) Hours As Needed for Wheezing., Disp: 9 g, Rfl: 0    dicyclomine (BENTYL) 10 MG capsule, Take 1 capsule by mouth 4 (Four) Times a Day Before Meals & at Bedtime., Disp: 120 capsule, Rfl: 0    docusate sodium 100 MG capsule, Take 1 capsule by mouth., Disp: , Rfl:     FeroSul 325 (65 Fe) MG tablet, Take  by mouth Daily With Breakfast., Disp: , Rfl:     Omeprazole 20 MG tablet delayed-release, Take 20 mg by mouth Daily., Disp: 30 each, Rfl: 1    sertraline (ZOLOFT) 50 MG tablet, Take 1 tablet by mouth Daily., Disp: 90 tablet, Rfl: 3    Review of Systems   Gastrointestinal:  Positive for diarrhea and GERD. Negative for abdominal distention, abdominal pain, anal bleeding, blood in stool, constipation, nausea and vomiting.   Psychiatric/Behavioral:  Positive for sleep disturbance and stress. Negative for self-injury, suicidal ideas and depressed mood. The patient is not nervous/anxious.    All other systems reviewed and are negative.      Social History     Socioeconomic History    Marital status:    Tobacco Use    Smoking status: Every Day     Current packs/day: 0.50     Average packs/day: 0.5 packs/day for 18.0 years (9.0 ttl pk-yrs)     Types: Cigarettes    Smokeless tobacco: Never   Vaping Use    Vaping status: Some Days    Substances: Nicotine   Substance and Sexual Activity    Alcohol use: Yes      "Comment: for [employer]     Drug use: Yes     Types: Marijuana    Sexual activity: Yes     Partners: Male     Comment:         Family History   Adopted: Yes   Problem Relation Age of Onset    Hypertension Mother            2/4/2025     1:13 PM   PHQ-2/PHQ-9 Depression Screening   Little interest or pleasure in doing things Not at all   Feeling down, depressed, or hopeless Not at all   How difficult have these problems made it for you to do your work, take care of things at home, or get along with other people? Not difficult at all       Fall Risk Screen:  YU Fall Risk Assessment has not been completed.      Objective     /86 (BP Location: Right arm, Patient Position: Sitting, Cuff Size: Large Adult)   Pulse 59   Temp 97 °F (36.1 °C) (Temporal)   Resp 18   Ht 154.9 cm (60.98\")   Wt 96.4 kg (212 lb 9.6 oz)   LMP 04/08/2024 (Exact Date)   SpO2 97%   Breastfeeding No   BMI 40.19 kg/m²     BP Readings from Last 2 Encounters:   02/04/25 122/86   08/15/24 106/56       Wt Readings from Last 2 Encounters:   02/04/25 96.4 kg (212 lb 9.6 oz)   08/15/24 96.7 kg (213 lb 3.2 oz)                Physical Exam  Vitals and nursing note reviewed.   Constitutional:       General: She is not in acute distress.     Appearance: Normal appearance. She is well-groomed and overweight. She is not ill-appearing.   HENT:      Head: Normocephalic and atraumatic.   Eyes:      General: Lids are normal. Vision grossly intact.   Neck:      Vascular: No carotid bruit.   Cardiovascular:      Rate and Rhythm: Normal rate and regular rhythm.      Heart sounds: Normal heart sounds.   Pulmonary:      Effort: Pulmonary effort is normal.      Breath sounds: Normal breath sounds and air entry.   Abdominal:      General: Bowel sounds are normal. There is no distension or abdominal bruit. There are no signs of injury.      Palpations: Abdomen is soft. There is no pulsatile mass.      Tenderness: There is no abdominal tenderness.         "  Comments: Healing surgical incision intact.  Steri strips noted on distal portion of incision.    Musculoskeletal:      Right lower leg: No edema.      Left lower leg: No edema.   Skin:     General: Skin is warm and dry.   Neurological:      Mental Status: She is alert and oriented to person, place, and time. Mental status is at baseline.   Psychiatric:         Mood and Affect: Mood normal.         Behavior: Behavior normal. Behavior is cooperative.       Physical Exam   Abdomen   Abdomen comments: Healing surgical incision intact.  Steri strips noted on distal portion of incision.       Physical Exam      Vital Signs  Blood pressure is normal.    Result Review :       Results  Laboratory Studies  CBC from 2024 showed hemoglobin of 10.9. On 2024, hemoglobin was 11.9.    Assessment and Plan     Plan      Assessment & Plan  H/O  section  She is currently anemic, with a hemoglobin level of 10.9 recorded on 2024, likely due to blood loss during the . Comprehensive blood work, including CBC, cholesterol, thyroid, CMP, and A1c, will be conducted. A urine test will also be performed. She will be scheduled for her annual physical examination. The blood work should be done while fasting. Medications will be sent to [facility] pharmacy.       Prediabetes    Orders:    Comprehensive Metabolic Panel    TSH Rfx On Abnormal To Free T4    Hemoglobin A1c    Mood disorder  She is currently on sertraline 50 mg, which appears to be effective. No thoughts of self-harm or harm to others were reported.    Orders:    sertraline (ZOLOFT) 50 MG tablet; Take 1 tablet by mouth Daily.    Depression, unspecified depression type  She is currently on sertraline 50 mg, which appears to be effective. No thoughts of self-harm or harm to others were reported.    Orders:    sertraline (ZOLOFT) 50 MG tablet; Take 1 tablet by mouth Daily.    High triglycerides   Lipid abnormalities are stable    Plan:  Check LFT.       Counseled patient on lifestyle modifications to help control hyperlipidemia.   Weight Loss encouraged    Patient Treatment Goals:   LDL goal is less than 70  LDL goal is under 100    Followup at the next regular appointment.    Orders:    Lipid Panel With LDL / HDL Ratio    CBC & Differential    Irritable bowel syndrome with both constipation and diarrhea    Orders:    dicyclomine (BENTYL) 10 MG capsule; Take 1 capsule by mouth 4 (Four) Times a Day Before Meals & at Bedtime.    Postprandial diarrhea  A right upper quadrant ultrasound will be ordered to evaluate the gallbladder. If the ultrasound results are negative, a referral to gastroenterology will be considered for further evaluation and management of IBS. Bentyl will be refilled, and omeprazole will be restarted.  Orders:    US Abdomen Limited      Assessment & Plan  1. Irritable bowel syndrome.  A right upper quadrant ultrasound will be ordered to evaluate the gallbladder. If the ultrasound results are negative, a referral to gastroenterology will be considered for further evaluation and management of IBS. Bentyl will be refilled, and omeprazole will be restarted.    2. Postpartum status post .  She is currently anemic, with a hemoglobin level of 10.9 recorded on 2024, likely due to blood loss during the . Comprehensive blood work, including CBC, cholesterol, thyroid, CMP, and A1c, will be conducted. A urine test will also be performed. She will be scheduled for her annual physical examination. The blood work should be done while fasting. Medications will be sent to [facility] pharmacy.    3. Depression.  She is currently on sertraline 50 mg, which appears to be effective. No thoughts of self-harm or harm to others were reported.        PROCEDURE  The patient underwent a  and tubal ligation.       Follow Up   Wrapup Tab  Return for Annual physical.     Patient was given instructions and counseling regarding her condition or  for health maintenance advice. Please see specific information pulled into the AVS if appropriate.  Hand hygiene was performed during entrance to exam room and following assessment of patient. This document is intended for medical expert use only.       SHAREE Ordaz, SADIQP-C  [address]  [facility]  [address] [place] [address] [address]  [place] [address] [phone]    Patient or patient representative verbalized consent for the use of Ambient Listening during the visit with  SHAREE Ordaz for chart documentation. 2/4/2025  13:58 EST

## 2025-02-17 ENCOUNTER — LAB (OUTPATIENT)
Dept: FAMILY MEDICINE CLINIC | Facility: CLINIC | Age: 40
End: 2025-02-17
Payer: MEDICAID

## 2025-02-17 ENCOUNTER — HOSPITAL ENCOUNTER (OUTPATIENT)
Dept: ULTRASOUND IMAGING | Facility: HOSPITAL | Age: 40
Discharge: HOME | End: 2025-02-17
Payer: MEDICAID

## 2025-02-17 DIAGNOSIS — R73.03 PREDIABETES: Primary | ICD-10-CM

## 2025-02-17 LAB
BILIRUB BLD-MCNC: NEGATIVE MG/DL
CLARITY, POC: CLEAR
COLOR UR: YELLOW
GLUCOSE UR STRIP-MCNC: NEGATIVE MG/DL
KETONES UR QL: NEGATIVE
LEUKOCYTE EST, POC: NEGATIVE
NITRITE UR-MCNC: NEGATIVE MG/ML
PH UR: 7 [PH] (ref 5–8)
PROT UR STRIP-MCNC: NEGATIVE MG/DL
RBC # UR STRIP: NEGATIVE /UL
SP GR UR: 1.02 (ref 1–1.03)
UROBILINOGEN UR QL: NORMAL

## 2025-02-17 PROCEDURE — 81003 URINALYSIS AUTO W/O SCOPE: CPT

## 2025-02-17 PROCEDURE — 76705 ECHO EXAM OF ABDOMEN: CPT

## 2025-02-18 DIAGNOSIS — K80.10 CALCULUS OF GALLBLADDER WITH CHOLECYSTITIS WITHOUT BILIARY OBSTRUCTION, UNSPECIFIED CHOLECYSTITIS ACUITY: Primary | ICD-10-CM

## 2025-02-18 LAB
ALBUMIN SERPL-MCNC: 4 G/DL (ref 3.9–4.9)
ALP SERPL-CCNC: 106 IU/L (ref 44–121)
ALT SERPL-CCNC: 16 IU/L (ref 0–32)
AST SERPL-CCNC: 18 IU/L (ref 0–40)
BASOPHILS # BLD AUTO: 0 X10E3/UL (ref 0–0.2)
BASOPHILS NFR BLD AUTO: 0 %
BILIRUB SERPL-MCNC: 0.2 MG/DL (ref 0–1.2)
BUN SERPL-MCNC: 7 MG/DL (ref 6–20)
BUN/CREAT SERPL: 10 (ref 9–23)
CALCIUM SERPL-MCNC: 8.9 MG/DL (ref 8.7–10.2)
CHLORIDE SERPL-SCNC: 106 MMOL/L (ref 96–106)
CHOLEST SERPL-MCNC: 259 MG/DL (ref 100–199)
CO2 SERPL-SCNC: 24 MMOL/L (ref 20–29)
CREAT SERPL-MCNC: 0.71 MG/DL (ref 0.57–1)
EGFRCR SERPLBLD CKD-EPI 2021: 112 ML/MIN/1.73
EOSINOPHIL # BLD AUTO: 0.2 X10E3/UL (ref 0–0.4)
EOSINOPHIL NFR BLD AUTO: 2 %
ERYTHROCYTE [DISTWIDTH] IN BLOOD BY AUTOMATED COUNT: 11.6 % (ref 11.7–15.4)
GLOBULIN SER CALC-MCNC: 2.3 G/DL (ref 1.5–4.5)
GLUCOSE SERPL-MCNC: 92 MG/DL (ref 70–99)
HBA1C MFR BLD: 5.6 % (ref 4.8–5.6)
HCT VFR BLD AUTO: 40.2 % (ref 34–46.6)
HDLC SERPL-MCNC: 44 MG/DL
HGB BLD-MCNC: 12.9 G/DL (ref 11.1–15.9)
IMM GRANULOCYTES # BLD AUTO: 0 X10E3/UL (ref 0–0.1)
IMM GRANULOCYTES NFR BLD AUTO: 0 %
LDLC SERPL CALC-MCNC: 136 MG/DL (ref 0–99)
LDLC/HDLC SERPL: 3.1 RATIO (ref 0–3.2)
LYMPHOCYTES # BLD AUTO: 2.9 X10E3/UL (ref 0.7–3.1)
LYMPHOCYTES NFR BLD AUTO: 32 %
MCH RBC QN AUTO: 30.5 PG (ref 26.6–33)
MCHC RBC AUTO-ENTMCNC: 32.1 G/DL (ref 31.5–35.7)
MCV RBC AUTO: 95 FL (ref 79–97)
MONOCYTES # BLD AUTO: 0.5 X10E3/UL (ref 0.1–0.9)
MONOCYTES NFR BLD AUTO: 6 %
NEUTROPHILS # BLD AUTO: 5.4 X10E3/UL (ref 1.4–7)
NEUTROPHILS NFR BLD AUTO: 60 %
PLATELET # BLD AUTO: 216 X10E3/UL (ref 150–450)
POTASSIUM SERPL-SCNC: 4 MMOL/L (ref 3.5–5.2)
PROT SERPL-MCNC: 6.3 G/DL (ref 6–8.5)
RBC # BLD AUTO: 4.23 X10E6/UL (ref 3.77–5.28)
SODIUM SERPL-SCNC: 142 MMOL/L (ref 134–144)
TRIGL SERPL-MCNC: 432 MG/DL (ref 0–149)
TSH SERPL DL<=0.005 MIU/L-ACNC: 1.14 UIU/ML (ref 0.45–4.5)
VLDLC SERPL CALC-MCNC: 79 MG/DL (ref 5–40)
WBC # BLD AUTO: 9 X10E3/UL (ref 3.4–10.8)

## 2025-02-20 DIAGNOSIS — K80.10 CALCULUS OF GALLBLADDER WITH CHOLECYSTITIS WITHOUT BILIARY OBSTRUCTION, UNSPECIFIED CHOLECYSTITIS ACUITY: Primary | ICD-10-CM

## 2025-03-05 DIAGNOSIS — K58.2 IRRITABLE BOWEL SYNDROME WITH BOTH CONSTIPATION AND DIARRHEA: ICD-10-CM

## 2025-03-05 NOTE — TELEPHONE ENCOUNTER
Caller: Carie Troy CHRISTA    Relationship: Self    Best call back number: [phone]      Requested Prescriptions:   Requested Prescriptions     Pending Prescriptions Disp Refills    dicyclomine (BENTYL) 10 MG capsule 120 capsule 0     Sig: Take 1 capsule by mouth 4 (Four) Times a Day Before Meals & at Bedtime.        Pharmacy where request should be sent: [facility] [address] [place] [address] [address] - [phone]  - [phone] FX     Last office visit with prescribing clinician: 2/4/2025   Last telemedicine visit with prescribing clinician: Visit date not found   Next office visit with prescribing clinician: 5/8/2025     Additional details provided by patient: SHE WOULD LIKE REFILLS ON IT TOO    Does the patient have less than a 3 day supply:  [x] Yes  [] No    Would you like a call back once the refill request has been completed: [] Yes [x] No    If the office needs to give you a call back, can they leave a voicemail: [] Yes [x] No    Viky Ramirez Rep   03/05/25 13:09 EST

## 2025-03-06 RX ORDER — DICYCLOMINE HYDROCHLORIDE 10 MG/1
10 CAPSULE ORAL
Qty: 120 CAPSULE | Refills: 0 | Status: SHIPPED | OUTPATIENT
Start: 2025-03-06

## 2025-03-10 ENCOUNTER — OFFICE VISIT (OUTPATIENT)
Dept: NEUROSURGERY | Facility: CLINIC | Age: 40
End: 2025-03-10
Payer: MEDICAID

## 2025-03-10 VITALS
HEIGHT: 61 IN | BODY MASS INDEX: 40.59 KG/M2 | RESPIRATION RATE: 18 BRPM | WEIGHT: 215 LBS | HEART RATE: 60 BPM | OXYGEN SATURATION: 98 %

## 2025-03-10 DIAGNOSIS — M96.1 POSTLAMINECTOMY SYNDROME, LUMBAR REGION: Primary | ICD-10-CM

## 2025-03-10 DIAGNOSIS — M53.3 SACROILIAC JOINT DYSFUNCTION OF BOTH SIDES: ICD-10-CM

## 2025-03-10 PROCEDURE — 1160F RVW MEDS BY RX/DR IN RCRD: CPT

## 2025-03-10 PROCEDURE — 99213 OFFICE O/P EST LOW 20 MIN: CPT

## 2025-03-10 PROCEDURE — 1159F MED LIST DOCD IN RCRD: CPT

## 2025-03-10 NOTE — PROGRESS NOTES
"Subjective     Chief Complaint   Patient presents with    Back Pain       HPI: Carie Troy is a 39 y.o. female who underwent L4-5 microdiscectomy with Dr. Gutierrez approximately 11 months ago who presents with recurrent low back pain.  Symptoms have been going on for several months.  Symptoms started and worsened throughout her recent pregnancy and have stabilized since giving birth in January.  Pain is worse with long periods of sitting or lying down and better with movement.  She has no lower extremity symptoms and no weakness.  No recent injections.      Previous Treatment:  Norco, Flexeril, PO PT     Previous Injections: none since surgery    Previous Neurosurgery: right lumbar 4 5 microdiscectomy 4/5/24      PAST MEDICAL HISTORY:    The following portions of the patient's history were reviewed and updated as appropriate: allergies, current medications, past family history, past medical history, past social history, past surgical history, and problem list.      Review of Systems   Constitutional:  Positive for activity change.   HENT: Negative.     Eyes: Negative.    Respiratory: Negative.     Cardiovascular: Negative.    Gastrointestinal:  Positive for abdominal pain (sometimes).   Endocrine: Negative.    Genitourinary: Negative.    Musculoskeletal:  Positive for arthralgias, back pain and myalgias.   Skin: Negative.    Allergic/Immunologic: Negative.    Neurological:  Negative for numbness.        Sharp pain    Hematological: Negative.    Psychiatric/Behavioral: Negative.           Objective     Pulse 60   Resp 18   Ht 154.9 cm (60.98\")   Wt 97.5 kg (215 lb)   SpO2 98%   BMI 40.65 kg/m²    Body mass index is 40.65 kg/m².      Physical Exam  Vitals reviewed.   Constitutional:       General: She is not in acute distress.     Appearance: Normal appearance. She is well-developed and well-groomed.   Pulmonary:      Effort: Pulmonary effort is normal. No respiratory distress.   Musculoskeletal:        " Back:       Comments: Pain and tenderness localized to bilateral SIJ sulcus as well as lumbar facet joints     Skin:     General: Skin is warm and dry.   Neurological:      General: No focal deficit present.      Sensory: Sensation is intact.      Motor: Motor function is intact.      Comments:                       Results Review  I personally reviewed and interpreted the images from the following studies:    No new imaging      Assessment & Plan     MDM: Carie Troy is a 39 y.o. female who is 11 months status post L4-5 microdiscectomy presenting with recurrent low back pain for the past several months.  Notably, symptoms recurred and have worsened throughout her recent pregnancy. no lower extremity symptoms or neurologic deficits.  Pain localizes to bilateral SIJ sulcus with suspected involvement of the lumbar facet joints.     I suspect some of patient's pain will improve the more she recovers from her recent pregnancy.  I also recommend smoking cessation and weight loss which could benefit her low back pain.  I am ordering flexion-extension studies to rule out dynamic instability given her history of surgery.  I discussed multiple treatment options including revisiting physical therapy and injections with pain management.  Patient would like to see pain management in [place].  I will see patient in 3 months after seeing pain management and getting x-rays to evaluate her progress.  Patient is agreeable this plan.       Diagnosis Plan   1. Postlaminectomy syndrome, lumbar region  XR Spine Lumbar Complete With Flex & Ext    Ambulatory Referral to Pain Management      2. Sacroiliac joint dysfunction of both sides  XR Spine Lumbar Complete With Flex & Ext    Ambulatory Referral to Pain Management          Return in about 3 months (around 6/10/2025).      Carie Troy  reports that she has been smoking cigarettes. She has a 9 pack-year smoking history. She has never used smokeless tobacco. I have educated her  on the risk of diseases from using tobacco products such as cancer, COPD, and heart disease.                      This patient was examined wearing appropriate personal protective equipment.            Thee Akbar PA-C    03/10/25  10:57 EDT      Part of this note may be an electronic transcription/translation of spoken language to printed text using the Dragon Dictation System.

## 2025-03-11 ENCOUNTER — TELEPHONE (OUTPATIENT)
Dept: NEUROSURGERY | Facility: CLINIC | Age: 40
End: 2025-03-11
Payer: MEDICAID

## 2025-03-11 NOTE — TELEPHONE ENCOUNTER
PAIN MANAGEMENT SENT REFERRAL BACK STATING PATIENT NEEDS TO COMPLETE XRAY ORDERED AT HER APPOINTMENT BEFORE THEY WILL SCHEDULE HER. CALLED PATIENT TO LET HER KNOW THIS. NO ANSWER, LEFT MESSAGE. AFTER SHE COMPLETES XRAY, SHE MAY CALL [phone] TO SCHEDULE WITH PAIN MANAGEMENT.  OKAY FOR HUB TO RELAY MESSAGE.

## 2025-03-11 NOTE — TELEPHONE ENCOUNTER
Name: Carie Troy    Relationship: Self    Best Callback Number: [phone]    HUB PROVIDED THE RELAY MESSAGE FROM THE OFFICE   PATIENT HAS FURTHER QUESTIONS AND WOULD LIKE A CALL BACK    ADDITIONAL INFORMATION: PATIENT WOULD LIKE XRAY ORDER TO BE SENT TO [facility]. PLEASE GIVE HER A CALL TO UPDATE.

## 2025-04-10 DIAGNOSIS — K58.2 IRRITABLE BOWEL SYNDROME WITH BOTH CONSTIPATION AND DIARRHEA: ICD-10-CM

## 2025-04-10 NOTE — TELEPHONE ENCOUNTER
Caller: Woodrow Carie N    Relationship: Self    Best call back number:   Telephone Information:   Mobile [phone]         Requested Prescriptions:   Requested Prescriptions     Pending Prescriptions Disp Refills    dicyclomine (BENTYL) 10 MG capsule 120 capsule 0     Sig: Take 1 capsule by mouth 4 (Four) Times a Day Before Meals & at Bedtime.        Pharmacy where request should be sent: [facility] [address] [place] [address] [address]  - [phone] [facility] [phone] FX     Last office visit with prescribing clinician: 2/4/2025   Last telemedicine visit with prescribing clinician: Visit date not found   Next office visit with prescribing clinician: 5/8/2025     Additional details provided by patient:     Does the patient have less than a 3 day supply:  [x] Yes  [] No    Would you like a call back once the refill request has been completed: [] Yes [] No    If the office needs to give you a call back, can they leave a voicemail: [] Yes [] No    Viky Strong Rep   04/10/25 11:51 EDT

## 2025-04-11 RX ORDER — DICYCLOMINE HYDROCHLORIDE 10 MG/1
10 CAPSULE ORAL
Qty: 120 CAPSULE | Refills: 0 | Status: SHIPPED | OUTPATIENT
Start: 2025-04-11

## 2025-04-14 ENCOUNTER — TELEPHONE (OUTPATIENT)
Dept: FAMILY MEDICINE CLINIC | Facility: CLINIC | Age: 40
End: 2025-04-14

## 2025-04-14 NOTE — TELEPHONE ENCOUNTER
Caller: Carie Troy    Relationship: Self    Best call back number: [phone]     What is the best time to reach you: ANY    Who are you requesting to speak with (clinical staff, provider,  specific staff member): HERMAN MENDOZA      What was the call regarding: PATIENT STATED THAT SHE NEEDS A LETTER ON [facility] LETTER HEAD THAT STATES HER EMOTIONAL SUPPORT ANIMAL IS HER DOG (HERSHY).     PATIENT STATED THAT SHE HAS GOTTEN A LETTER BEFORE SO [facility] PATIENT ALSO WANTED TO KNOW IF YOU CAN PRINT OFF THE LETTER FOR HER TO  AS WELL, AND GIVE HER A CALL WHEN THAT IS READY

## 2025-05-05 ENCOUNTER — TELEPHONE (OUTPATIENT)
Dept: NEUROSURGERY | Facility: CLINIC | Age: 40
End: 2025-05-05
Payer: MEDICAID

## 2025-05-05 NOTE — TELEPHONE ENCOUNTER
Called patient and told her that we don't give any type of Pain Medication unless you've had a recent surgery. Her surgery was on 4/5/2024. I did also explain that her recent ED visit at [facility] did not include any type of Spine Imaging. I did tell her she would need to call her PCP for pain medication.  but I would send this to the Provider here in the office for any further recommendations. She verbally understood.

## 2025-05-08 ENCOUNTER — OFFICE VISIT (OUTPATIENT)
Dept: FAMILY MEDICINE CLINIC | Facility: CLINIC | Age: 40
End: 2025-05-08
Payer: MEDICAID

## 2025-05-08 VITALS
WEIGHT: 233 LBS | OXYGEN SATURATION: 96 % | SYSTOLIC BLOOD PRESSURE: 154 MMHG | HEIGHT: 61 IN | BODY MASS INDEX: 43.99 KG/M2 | TEMPERATURE: 97 F | RESPIRATION RATE: 18 BRPM | DIASTOLIC BLOOD PRESSURE: 117 MMHG | HEART RATE: 66 BPM

## 2025-05-08 DIAGNOSIS — F39 MOOD DISORDER: ICD-10-CM

## 2025-05-08 DIAGNOSIS — Z86.19 HISTORY OF SYPHILIS: ICD-10-CM

## 2025-05-08 DIAGNOSIS — N64.4 BREAST PAIN: ICD-10-CM

## 2025-05-08 DIAGNOSIS — M54.16 LUMBAR RADICULOPATHY: ICD-10-CM

## 2025-05-08 DIAGNOSIS — F12.10 MARIJUANA ABUSE: ICD-10-CM

## 2025-05-08 DIAGNOSIS — E78.1 HIGH TRIGLYCERIDES: ICD-10-CM

## 2025-05-08 DIAGNOSIS — I10 HYPERTENSION, UNSPECIFIED TYPE: ICD-10-CM

## 2025-05-08 DIAGNOSIS — Z00.00 WELL WOMAN EXAM WITHOUT GYNECOLOGICAL EXAM: Primary | ICD-10-CM

## 2025-05-08 DIAGNOSIS — Z13.29 SCREENING FOR THYROID DISORDER: ICD-10-CM

## 2025-05-08 DIAGNOSIS — Z72.0 TOBACCO ABUSE: ICD-10-CM

## 2025-05-08 DIAGNOSIS — R05.3 CHRONIC COUGH: ICD-10-CM

## 2025-05-08 DIAGNOSIS — R73.03 PREDIABETES: ICD-10-CM

## 2025-05-08 LAB
BILIRUB BLD-MCNC: NEGATIVE MG/DL
CLARITY, POC: CLEAR
COLOR UR: YELLOW
GLUCOSE UR STRIP-MCNC: NEGATIVE MG/DL
KETONES UR QL: NEGATIVE
LEUKOCYTE EST, POC: NEGATIVE
NITRITE UR-MCNC: NEGATIVE MG/ML
PH UR: 7 [PH] (ref 5–8)
PROT UR STRIP-MCNC: NEGATIVE MG/DL
RBC # UR STRIP: ABNORMAL /UL
SP GR UR: 1.02 (ref 1–1.03)
UROBILINOGEN UR QL: ABNORMAL

## 2025-05-08 PROCEDURE — 1125F AMNT PAIN NOTED PAIN PRSNT: CPT

## 2025-05-08 PROCEDURE — 99214 OFFICE O/P EST MOD 30 MIN: CPT

## 2025-05-08 PROCEDURE — 2014F MENTAL STATUS ASSESS: CPT

## 2025-05-08 PROCEDURE — 99395 PREV VISIT EST AGE 18-39: CPT

## 2025-05-08 RX ORDER — METHOCARBAMOL 750 MG/1
750 TABLET, FILM COATED ORAL 3 TIMES DAILY
COMMUNITY

## 2025-05-08 RX ORDER — ALBUTEROL SULFATE 90 UG/1
2 INHALANT RESPIRATORY (INHALATION) EVERY 4 HOURS PRN
Qty: 9 G | Refills: 0 | Status: SHIPPED | OUTPATIENT
Start: 2025-05-08

## 2025-05-08 NOTE — ASSESSMENT & PLAN NOTE
- Smokes half a pack of cigarettes daily and uses marijuana on and off.  - Not currently interested in quitting smoking.

## 2025-05-08 NOTE — ASSESSMENT & PLAN NOTE
Encouraged healthy heart mediterranean style diet.       Orders:    Lipid Panel With LDL / HDL Ratio; Future

## 2025-05-08 NOTE — PROGRESS NOTES
Office Note          Name: Carie Troy    : 1985     MRN: [id number]     Chief Complaint  Annual Exam and Gynecologic Exam    Subjective     Carie Troy is a 39 y.o. female here for her annual wellness and physical exam..    Carie is additionally here for coordination of medical care, to discuss health maintenance, disease prevention as well as to follow-up on medical problems.   HPI      History of Present Illness  The patient presents for an annual wellness exam.    She reports overall good health, with the exception of a recent fall down 11 stairs.     She is three months post  section delivery.  She reports she has discontinued her iron supplementation and is not breastfeeding her infant. . Her  incision has healed well, and she reports no abnormal bleeding, except for her menstrual period, which started on 2025. She uses pads during her menstrual cycle.     She is uncertain about any history of abnormal Pap smears and reports no changes in sexual partners or practices.     She was previously treated for syphilis at [facility] and currently has no lesions.     She does not perform regular breast self-examinations.     She reports a persistent cough, which she attributes to her smoking habit. She experiences lower back pain and has consulted her back specialists, Dr. Primitivo Gutierrez and Naomie Kingston.  She has a scheduled appointment with them on 2025.     She experiences intermittent diarrhea.    She continues her daily regimen of Zoloft and omeprazole. She reports that sertraline is effective, but she continues to experience anger issues. She is considering the use of Xanax, as recommended by her peers.    She requires a refill of her albuterol inhaler. She consumes half a pack of tobacco daily and uses marijuana intermittently. She is not interested in smoking cessation at this time.     GYNECOLOGICAL HISTORY:  - Last Menstrual Period: 2025    PAST  "SURGICAL HISTORY:  -     SOCIAL HISTORY  The patient smokes half a pack of tobacco daily and uses marijuana on and off.      Preventive Health/Lifestyle:     General Health: fair  Nutrition: in general, a \"healthy\" diet  , in general, an \"unhealthy\" diet, on average,   fast food meals per week  Activity level is occasionally.   Exercises 0 per week.  Energy level is fair.  Sleep: fairly well  Hours of Sleep: 5   Snoring: Yes  Skin Self Exam: occasionally  Monthly Breast Self Exam: Does not perform monthly breast exam  LMP - 25 -   Contraceptive History: Tubal ligation    Last Physical Exam:    Last tetanus shot was unknown. .  Pap Smear: 2023  Last Completed Pap Smear    This patient has no relevant Health Maintenance data.      Findings on last pap: patient does not recall when last pap was}  Mammogram:   Last Completed Mammogram    This patient has no relevant Health Maintenance data.      she has never had a mammogram  Bone Dexa scan: None  Colonoscopy:   Last Completed Colonoscopy    This patient has no relevant Health Maintenance data.      declines colon cancer screening    Recent Hospitalizations:  No hospitalization(s) within the last year..       I personally reviewed and updated the patient's allergies, medications, problem list, and past medical, surgical, social, and family history.       Current Outpatient Medications:     albuterol sulfate  (90 Base) MCG/ACT inhaler, Inhale 2 puffs Every 4 (Four) Hours As Needed for Wheezing., Disp: 9 g, Rfl: 0    dicyclomine (BENTYL) 10 MG capsule, Take 1 capsule by mouth 4 (Four) Times a Day Before Meals & at Bedtime., Disp: 120 capsule, Rfl: 0    methocarbamol (ROBAXIN) 750 MG tablet, Take 1 tablet by mouth 3 (Three) Times a Day., Disp: , Rfl:     Omeprazole 20 MG tablet delayed-release, Take 20 mg by mouth Daily., Disp: 30 each, Rfl: 1    sertraline (ZOLOFT) 50 MG tablet, Take 1 tablet by mouth Daily., Disp: 90 tablet, Rfl: " 3      Allergies   Allergen Reactions    Gabapentin Other (See Comments)     Makes very tired    Desogestrel-Ethinyl Estradiol Other (See Comments)    Ibuprofen Rash    Tylenol [Acetaminophen] Rash       Immunization History   Administered Date(s) Administered    DTP 1988, 1988, 1988, 1989, 1993    Hep B, Adolescent or Pediatric 1999, 1999, 1999    Hib (HbOC) 1989    Influenza Seasonal Injectable 10/09/2013    MMR 03/10/1989, 1999    OPV 1988, 1988, 1989, 1993       Review of Systems   Respiratory:  Positive for cough (smoker's cough).    Cardiovascular: Negative.    Gastrointestinal: Negative.    Genitourinary:  Positive for breast pain and vaginal discharge. Negative for breast discharge, breast lump, decreased libido, difficulty urinating and menstrual problem.   Musculoskeletal:  Positive for back pain.          Past Medical History:   Diagnosis Date    Allergic     Asthma     Chronic back pain     Depression     GERD (gastroesophageal reflux disease)     Hypotension     IBS (irritable bowel syndrome)     Prediabetes 10/19/2023    Seizures        Past Surgical History:   Procedure Laterality Date     SECTION  2012    [place]     SECTION  10/12/2015    [place]     SECTION  07/10/2018    [place]     SECTION  12/10/2019    [place]    DILATION AND CURETTAGE, DIAGNOSTIC / THERAPEUTIC  2007    LUMBAR DISCECTOMY N/A 2024    Procedure: Right lumbar 4 5 microdiscectomy;  Surgeon: Primitivo Gutierrez IV, MD;  Location: [facility] MAIN OR;  Service: Neurosurgery;  Laterality: N/A;       Family History   Adopted: Yes   Problem Relation Age of Onset    Hypertension Mother        Social History     Socioeconomic History    Marital status:    Tobacco Use    Smoking status: Every Day     Current packs/day: 0.50     Average packs/day: 0.5 packs/day for 18.0 years (9.0 ttl pk-yrs)     Types: Cigarettes    Smokeless  "tobacco: Never   Vaping Use    Vaping status: Some Days    Substances: Nicotine   Substance and Sexual Activity    Alcohol use: Yes     Comment: for hoildays     Drug use: Yes     Types: Marijuana    Sexual activity: Yes     Partners: Male     Comment:         The ASCVD Risk score (Kong ROSALES, et al., 2019) failed to calculate for the following reasons:    The 2019 ASCVD risk score is only valid for ages 40 to 79    PHQ-2 Depression Screening      5/8/2025     9:49 AM   PHQ-2/PHQ-9 Depression Screening   Little interest or pleasure in doing things Not at all   Feeling down, depressed, or hopeless Not at all   How difficult have these problems made it for you to do your work, take care of things at home, or get along with other people? Not difficult at all       Fall Risk Screen:  YU Fall Risk Assessment has not been completed.    Objective     Vital Signs:  BP (!) 154/117 (BP Location: Left arm, Patient Position: Sitting, Cuff Size: Large Adult)   Pulse 66   Temp 97 °F (36.1 °C) (Temporal)   Resp 18   Ht 154.9 cm (60.98\")   Wt 106 kg (233 lb)   SpO2 96%   BMI 44.05 kg/m²     BP Readings from Last 2 Encounters:   05/08/25 (!) 154/117   02/04/25 122/86       Wt Readings from Last 2 Encounters:   05/08/25 106 kg (233 lb)   03/10/25 97.5 kg (215 lb)                Physical Exam:  Physical Exam  Vitals and nursing note reviewed.   Constitutional:       General: She is not in acute distress.     Appearance: Normal appearance. She is well-groomed and overweight. She is not ill-appearing.   HENT:      Head: Normocephalic and atraumatic.   Eyes:      General: Lids are normal. Vision grossly intact.   Neck:      Vascular: No carotid bruit.   Cardiovascular:      Rate and Rhythm: Normal rate and regular rhythm.      Heart sounds: Normal heart sounds.   Pulmonary:      Effort: Pulmonary effort is normal.      Breath sounds: Normal breath sounds and air entry.   Chest:      Chest wall: Mass and tenderness " present.   Breasts:     Right: Mass and tenderness present. No bleeding or inverted nipple.      Left: Skin change present. No swelling, bleeding, inverted nipple, mass or nipple discharge.          Comments: X marks patient's area of breast pain.   Underlying firmness noted at circled area during palpation of breast.   Square area on left breast marks erythematous color changes streaked pattern.   Abdominal:      General: Bowel sounds are normal. There is no distension or abdominal bruit. There are no signs of injury.      Palpations: Abdomen is soft. There is no pulsatile mass.      Tenderness: There is no abdominal tenderness.          Comments: Linear keloid scar noted at previous  section incision site.      Genitourinary:     Exam position: Lithotomy position.      Vagina: Bleeding present.      Comments: Unable to perform cervical exam due to patient currently menstruating.  Musculoskeletal:      Right lower leg: No edema.      Left lower leg: No edema.   Lymphadenopathy:      Upper Body:      Right upper body: No supraclavicular, axillary or pectoral adenopathy.      Left upper body: No supraclavicular, axillary or pectoral adenopathy.   Skin:     General: Skin is warm and dry.   Neurological:      Mental Status: She is alert and oriented to person, place, and time. Mental status is at baseline.   Psychiatric:         Mood and Affect: Mood normal.         Behavior: Behavior normal. Behavior is cooperative.       Physical Exam   Chest   Chest comments: X marks patient's area of breast pain.   Underlying firmness noted at circled area during palpation of breast.   Square area on left breast marks erythematous color changes streaked pattern.     Abdomen   Abdomen comments: Linear keloid scar noted at previous  section incision site.       Pelvic   Pelvic comments: Unable to perform cervical exam due to patient currently menstruating.      Physical Exam  Skin: Small rash noted, intertrigo  present  Genitourinary: Redness and irritation around vaginal area, still bleeding  Breast: Normal appearance, no masses or tenderness, no nipple retraction, dimpling, or discharge  Other:  incision looks great    Results:   Result Review :   Results          Labs:       Imaging:   No valid procedures specified.        Data reviewed:          Assessment / Plan      Assessment/Plan:     Assessment & Plan  Well woman exam without gynecological exam  Discussed injury prevention, diet and exercise, safe sexual practices, and screening for common diseases.  Encouraged use of sunscreen and seatbelts. Discussed timing of  cervical cancer screening.   Encouraged monthly self-breast exams, yearly clinical breast exams, and discussed timing of mammograms.   Avoidance of tobacco encouraged.   Limitation or avoidance of alcohol encouraged.   Anticipatory guidance given and routine screenings recommended with recommendations of yearly dental and eye exams, monitoring of blood pressure, lipids, and screening for colon and lung cancer risks.     Orders:    Comprehensive Metabolic Panel; Future    CBC & Differential; Future    POCT urinalysis dipstick, multipro    Hypertension, unspecified type  Check home blood pressure readings and bring in to next appt on .   Recheck b/p at discharge.   Goal 120/80.          Breast pain    Orders:    Mammo Diagnostic Digital Tomosynthesis Bilateral With CAD    Prediabetes    Orders:    Hemoglobin A1c; Future    High triglycerides   Encouraged healthy heart mediterranean style diet.       Orders:    Lipid Panel With LDL / HDL Ratio; Future    Mood disorder  - Reports that sertraline is working but still experiences some anger issues.  - Discussed the possibility of increasing the dosage of Zoloft or switching to a different medication.  - Referral to psychiatry was suggested if needed.         Lumbar radiculopathy  - Reports ongoing lower back pain and has an appointment with her  back doctor on 06/21/2025 for further evaluation and management.  - Referred to pain management for long-term continued pain management.       Tobacco abuse  - Smokes half a pack of cigarettes daily and uses marijuana on and off.  - Not currently interested in quitting smoking.       Marijuana abuse  - Smokes half a pack of cigarettes daily and uses marijuana on and off.  - Not currently interested in quitting smoking.         Chronic cough    Orders:    albuterol sulfate  (90 Base) MCG/ACT inhaler; Inhale 2 puffs Every 4 (Four) Hours As Needed for Wheezing.    History of syphilis         Screening for thyroid disorder    Orders:    TSH Rfx On Abnormal To Free T4; Future      Assessment & Plan  1. Annual wellness examination.  - Currently experiencing menstrual bleeding, which precludes the performance of a Pap smear at this time.  - Comprehensive discussion regarding the importance of a healthy diet, specifically the Mediterranean diet, was conducted. Advised to abstain from alcohol, illicit drugs, and tobacco use. Recommended to limit caffeine intake to no more than two servings per day.  - Safety measures such as seatbelt usage, sunscreen application, and ensuring the functionality of smoke detectors were emphasized. Advised to secure firearms in a safe location, monitor skin for any changes, and examine partner's skin as well. In the event of any changes in sexual partners or practices, a repeat STI exam was recommended.  - Follow-up appointment will be scheduled for Pap smear. Fasting blood work, including a complete blood count, metabolic profile, A1c for diabetes, and thyroid function tests, will be ordered.    2. Medication management.  - Continues to take Zoloft (sertraline) and omeprazole daily.  - Refill for the albuterol inhaler will be provided.  - Prescription Drug Monitoring Program was reviewed.    3. Tobacco use.  - Smokes half a pack of cigarettes daily and uses marijuana on and off.  - Not  currently interested in quitting smoking.    4. Lower back pain.  - Reports ongoing lower back pain and has an appointment with her back doctor on 06/21/2025 for further evaluation and management.  - Referred to pain management for long-term continued pain management.      5. Anger issues.  - Reports that sertraline is working but still experiences some anger issues.  - Discussed the possibility of increasing the dosage of Zoloft or switching to a different medication.  - Referral to psychiatry was suggested if needed.       Follow Up   Wrapup Tab  Return for Needs pap smear .    There are no Patient Instructions on file for this visit.   Patient was given instructions and counseling regarding her condition or for health maintenance advice. Please see specific information pulled into the AVS if appropriate.  Hand hygiene was performed during entrance to exam room and following assessment of patient. This document is intended for medical expert use only.     EMR Dragon/Transcription disclaimer:   Much of this encounter note is an electronic transcription/translation of spoken language to printed text. The electronic translation of spoken language may permit erroneous, or at times, nonsensical words or phrases to be inadvertently transcribed.      SHAREE Ordaz, FNP-C  [facility] [address] [address]  [facility]  [address] [place] [address] [address]  [place] [address] [phone]      Patient or patient representative verbalized consent for the use of Ambient Listening during the visit with  SHAREE Ordaz for chart documentation. 5/12/2025  09:57 EDT

## 2025-05-12 NOTE — ASSESSMENT & PLAN NOTE
- Reports that sertraline is working but still experiences some anger issues.  - Discussed the possibility of increasing the dosage of Zoloft or switching to a different medication.  - Referral to psychiatry was suggested if needed.

## 2025-05-13 ENCOUNTER — OFFICE VISIT (OUTPATIENT)
Dept: FAMILY MEDICINE CLINIC | Facility: CLINIC | Age: 40
End: 2025-05-13
Payer: MEDICAID

## 2025-05-13 ENCOUNTER — TELEPHONE (OUTPATIENT)
Dept: FAMILY MEDICINE CLINIC | Facility: CLINIC | Age: 40
End: 2025-05-13

## 2025-05-13 VITALS
OXYGEN SATURATION: 99 % | HEIGHT: 61 IN | WEIGHT: 233 LBS | SYSTOLIC BLOOD PRESSURE: 148 MMHG | TEMPERATURE: 97.4 F | RESPIRATION RATE: 18 BRPM | HEART RATE: 62 BPM | DIASTOLIC BLOOD PRESSURE: 99 MMHG | BODY MASS INDEX: 43.99 KG/M2

## 2025-05-13 DIAGNOSIS — Z72.0 TOBACCO ABUSE: ICD-10-CM

## 2025-05-13 DIAGNOSIS — Z78.9 CAFFEINE USE: ICD-10-CM

## 2025-05-13 DIAGNOSIS — E78.1 HIGH TRIGLYCERIDES: ICD-10-CM

## 2025-05-13 DIAGNOSIS — I10 PRIMARY HYPERTENSION: Primary | ICD-10-CM

## 2025-05-13 DIAGNOSIS — F12.10 MARIJUANA ABUSE: ICD-10-CM

## 2025-05-13 DIAGNOSIS — R73.03 PREDIABETES: ICD-10-CM

## 2025-05-13 DIAGNOSIS — Z13.29 SCREENING FOR THYROID DISORDER: ICD-10-CM

## 2025-05-13 PROCEDURE — 99214 OFFICE O/P EST MOD 30 MIN: CPT

## 2025-05-13 PROCEDURE — 3077F SYST BP >= 140 MM HG: CPT

## 2025-05-13 PROCEDURE — 3080F DIAST BP >= 90 MM HG: CPT

## 2025-05-13 PROCEDURE — 1125F AMNT PAIN NOTED PAIN PRSNT: CPT

## 2025-05-13 RX ORDER — LISINOPRIL 20 MG/1
20 TABLET ORAL DAILY
Qty: 30 TABLET | Refills: 0 | Status: SHIPPED | OUTPATIENT
Start: 2025-05-13

## 2025-05-13 NOTE — ASSESSMENT & PLAN NOTE
Lipid levels will be checked.  Encouraged low salt low fried food diet.  Regular exercise.       Orders:    Lipid Panel With LDL / HDL Ratio; Future

## 2025-05-13 NOTE — TELEPHONE ENCOUNTER
Patient unable to come tomorrow. She is on the schedule to see you today at 4:30      ----- Message from Carol Yu sent at 5/12/2025  7:33 PM EDT -----  Please contact patient and ask her to check her home blood pressure readings.  At the last office visit her blood pressure was severely elevated and follow-up repeat blood pressure reading after office visit was not documented.Would like to see her back  for repeat blood pressure check tomorrow.

## 2025-05-13 NOTE — PROGRESS NOTES
Chief Complaint  Hypertension    Subjective      History of Present Illness:  Carie Troy is a 39 y.o. female who presents to [facility] for patient was here for recheck BP. Was asked to come in tomorrow and was unable to so came in today for recheck still elevated.     Hypertension  Chronicity:  New  Onset:  In the past 7 days  Progression since onset:  Unchanged  Condition status:  Uncontrolled  Associated symptoms: dizziness    Associated symptoms: no chest pain, no palpitations and no shortness of breath    Agents associated with hypertension:  No associated agents  CAD risks:  No known risk factors  Past treatments:  Nothing  Improvement on treatment:  No improvement  Compliance problems:  No compliance problems      History of Present Illness  The patient is a 39-year-old female who presents for postpartum hypertension.    She has not been monitoring her blood pressure at home and reports feeling well overall. Her blood pressure was recorded at 148/99 today, and it was 154/117 on 05/08/2025. She is not currently on any antihypertensive medication and has no history of such treatment. She recalls experiencing elevated blood pressure during her pregnancy but not post-delivery. She reports no chest pain or pedal edema. She admits to occasional dizziness and palpitations, which are activity-dependent. She also acknowledges a high intake of caffeine. She is not breastfeeding and continues to smoke.    SOCIAL HISTORY  She admits to smoking. She does not report alcohol intake.    FAMILY HISTORY  She is adopted and does not know her family history.    Carie Troy  has a past medical history of Allergic, Asthma, Chronic back pain, Depression, GERD (gastroesophageal reflux disease), Hypotension, IBS (irritable bowel syndrome), Prediabetes (10/19/2023), and Seizures.      Allergies   Allergen Reactions    Gabapentin Other (See Comments)     Makes very tired     Desogestrel-Ethinyl Estradiol Other (See Comments)    Ibuprofen Rash    Tylenol [Acetaminophen] Rash       Social History     Socioeconomic History    Marital status:    Tobacco Use    Smoking status: Every Day     Current packs/day: 0.50     Average packs/day: 0.5 packs/day for 18.0 years (9.0 ttl pk-yrs)     Types: Cigarettes    Smokeless tobacco: Never   Vaping Use    Vaping status: Some Days    Substances: Nicotine   Substance and Sexual Activity    Alcohol use: Yes     Comment: for hoildays     Drug use: Yes     Types: Marijuana    Sexual activity: Yes     Partners: Male     Comment:         Family History   Adopted: Yes   Problem Relation Age of Onset    Hypertension Mother        Review of Systems   Respiratory:  Negative for cough, shortness of breath and wheezing.    Cardiovascular:  Negative for chest pain, palpitations and leg swelling.   Neurological:  Positive for dizziness.   All other systems reviewed and are negative.          5/8/2025     9:49 AM   PHQ-2/PHQ-9 Depression Screening   Little interest or pleasure in doing things Not at all   Feeling down, depressed, or hopeless Not at all   How difficult have these problems made it for you to do your work, take care of things at home, or get along with other people? Not difficult at all       Fall Risk Screen:  YU Fall Risk Assessment has not been completed.    Objective       Current Outpatient Medications:     albuterol sulfate  (90 Base) MCG/ACT inhaler, Inhale 2 puffs Every 4 (Four) Hours As Needed for Wheezing., Disp: 9 g, Rfl: 0    dicyclomine (BENTYL) 10 MG capsule, Take 1 capsule by mouth 4 (Four) Times a Day Before Meals & at Bedtime., Disp: 120 capsule, Rfl: 0    lisinopril (PRINIVIL,ZESTRIL) 20 MG tablet, Take 1 tablet by mouth Daily., Disp: 30 tablet, Rfl: 0    methocarbamol (ROBAXIN) 750 MG tablet, Take 1 tablet by mouth 3 (Three) Times a Day., Disp: , Rfl:     Omeprazole 20 MG tablet delayed-release, Take 20 mg by  "mouth Daily., Disp: 30 each, Rfl: 1    sertraline (ZOLOFT) 50 MG tablet, Take 1 tablet by mouth Daily., Disp: 90 tablet, Rfl: 3    Vitals:    05/13/25 1546   BP: 148/99   Pulse: 62   Resp: 18   Temp: 97.4 °F (36.3 °C)   TempSrc: Temporal   SpO2: 99%   Weight: 106 kg (233 lb)   Height: 154.9 cm (60.98\")         Class 3 Severe Obesity (BMI >=40). Obesity-related health conditions include the following: hypertension and dyslipidemias. Obesity is unchanged. BMI is is above average; BMI management plan is completed. We discussed portion control and increasing exercise.         Physical Exam  Vitals and nursing note reviewed.   Constitutional:       General: She is not in acute distress.     Appearance: Normal appearance. She is well-groomed. She is not ill-appearing.   HENT:      Head: Normocephalic and atraumatic.   Eyes:      General: Lids are normal. Vision grossly intact.   Neck:      Vascular: No carotid bruit.   Cardiovascular:      Rate and Rhythm: Normal rate and regular rhythm.      Heart sounds: Normal heart sounds.   Pulmonary:      Effort: Pulmonary effort is normal.      Breath sounds: Normal breath sounds and air entry.   Musculoskeletal:      Right lower leg: No edema.      Left lower leg: No edema.   Skin:     General: Skin is warm and dry.   Neurological:      Mental Status: She is alert and oriented to person, place, and time. Mental status is at baseline.   Psychiatric:         Mood and Affect: Mood normal.         Behavior: Behavior normal. Behavior is cooperative.       Derm Physical Exam  Physical Exam      Result Review :     The PHQ has not been completed during this encounter.       Results      Labs:       Imaging:   No valid procedures specified.        Data reviewed:             Plan      Assessment & Plan  Primary hypertension  - Blood pressure readings indicate hypertension, with a current reading of 148/99 and a previous reading of 154/117 on 05/08/2025.  - Advised to cease smoking, reduce " caffeine intake, and limit the consumption of fried foods to help manage blood pressure.  - Comprehensive blood work panel, including A1c, CBC, thyroid function tests, cholesterol levels, and CMP, will be ordered.  - Instructed to acquire an Om[employer] brand blood pressure cuff for home monitoring, ensuring she is seated for approximately 5 minutes prior to each reading. Target blood pressure is set at 120/80. Maintain a log of daily blood pressure readings, taken 1 to 2 hours post-medication, and bring this log to the next appointment. Commence lisinopril 20 mg daily. If blood pressure exceeds 140s/90s or if headaches occur, inform immediately for potential medication adjustment.  Orders:    lisinopril (PRINIVIL,ZESTRIL) 20 MG tablet; Take 1 tablet by mouth Daily.    CBC & Differential; Future    Comprehensive Metabolic Panel; Future    Prediabetes    Orders:    Hemoglobin A1c; Future    Screening for thyroid disorder    Orders:    TSH Rfx On Abnormal To Free T4; Future    High triglycerides   Lipid levels will be checked.  Encouraged low salt low fried food diet.  Regular exercise.       Orders:    Lipid Panel With LDL / HDL Ratio; Future    Tobacco abuse  Advised to cease smoking. Follow up at next appt.        Marijuana abuse  Advised to cease smoking. Follow up at next appt.          Caffeine use  Encouraged to cease or greatly decrease caffeine use.          Assessment & Plan  1. Postpartum hypertension.  - Blood pressure readings indicate hypertension, with a current reading of 148/99 and a previous reading of 154/117 on 05/08/2025.  - Advised to cease smoking, reduce caffeine intake, and limit the consumption of fried foods to help manage blood pressure.  - Comprehensive blood work panel, including A1c, CBC, thyroid function tests, cholesterol levels, and CMP, will be ordered.  - Instructed to acquire an Omron brand blood pressure cuff for home monitoring, ensuring she is seated for approximately 5 minutes prior  to each reading. Target blood pressure is set at 120/80. Maintain a log of daily blood pressure readings, taken 1 to 2 hours post-medication, and bring this log to the next appointment. Commence lisinopril 20 mg daily. If blood pressure exceeds 140s/90s or if headaches occur, inform immediately for potential medication adjustment.    2. Health maintenance.  - Pap smear will be scheduled during the next visit.  - Menstrual cycle has resolved.       Follow Up   Wrapup Tab  Return for Return for pap smear and b/p check. .     Patient was given instructions and counseling regarding her condition or for health maintenance advice. Please see specific information pulled into the AVS if appropriate.  Hand hygiene was performed during entrance to exam room and following assessment of patient. This document is intended for medical expert use only.       SHAREE Ordaz, FNP-C  [address]  [facility]  [address] [place] [address] [address]  [place] [address] [phone]    Patient or patient representative verbalized consent for the use of Ambient Listening during the visit with  SHAREE Ordaz for chart documentation. 5/13/2025  16:15 EDT

## 2025-05-13 NOTE — PATIENT INSTRUCTIONS
Please purchase an Omron brand blood pressure cuff for home monitoring  Target blood pressure is set at 120/80.   Maintain a log of daily blood pressure readings, taken 1 to 2 hours post-medication, and bring this log to the next appointment.   Begin taking Lisinopril 20 mg daily. I  f blood pressure exceeds 140s/90s or if headaches occur, inform immediately for potential medication adjustment.

## 2025-05-13 NOTE — ASSESSMENT & PLAN NOTE
- Blood pressure readings indicate hypertension, with a current reading of 148/99 and a previous reading of 154/117 on 05/08/2025.  - Advised to cease smoking, reduce caffeine intake, and limit the consumption of fried foods to help manage blood pressure.  - Comprehensive blood work panel, including A1c, CBC, thyroid function tests, cholesterol levels, and CMP, will be ordered.  - Instructed to acquire an Omron brand blood pressure cuff for home monitoring, ensuring she is seated for approximately 5 minutes prior to each reading. Target blood pressure is set at 120/80. Maintain a log of daily blood pressure readings, taken 1 to 2 hours post-medication, and bring this log to the next appointment. Commence lisinopril 20 mg daily. If blood pressure exceeds 140s/90s or if headaches occur, inform immediately for potential medication adjustment.  Orders:    lisinopril (PRINIVIL,ZESTRIL) 20 MG tablet; Take 1 tablet by mouth Daily.    CBC & Differential; Future    Comprehensive Metabolic Panel; Future

## 2025-05-20 NOTE — PROGRESS NOTES
CHIEF COMPLAINT  Lower back pain      Subjective   Carie Troy is a 39 y.o. female who was referred by Thee Akbar PA  to our pain management clinic for consultation, evaluation and treatment of lower back pain and post laminectomy syndrome. Initial pain started in 2012 after a water slide accident. Chronic history of lower back pain with worsening of pain for which she underwent right L4-5 microdiscectomy with Dr. Gutierrez on 4/5/2024. Significantly improved radicular pain after surgery but continues to have lower back pain. LBP started worsening throughout her recent pregnancy and somewhat stabilized after giving birth in January.  No radicular symptoms or weakness.  She has tried Norco, Flexeril, PT in the past.  There was concern regarding possibility of sacroiliitis versus lumbar spondylosis increasing her axial lower back pain and sent to us for further treatment.    Lower back pain is 5/10 on VAS, at maximum is 10/10. Pain is aching, dull, sharp, and stabbing in nature. Pain is not referred. The pain is constant. The pain is improved by narcotics. The pain is worse with increased activities.  Her main complaint is axial lower back pain.    PHQ-9- 7    SOAPP- 7  [place] back disability scale - 20    PMH:   Hypertension, Chiari malformation type I, depression, right L4-5 microdiscectomy-4/5/2024    Current Medications:   Robaxin-750 mg   Zoloft      Past Medications:  Meloxicam  Has tried multiple antinflmatory medications    Past Modalities:  TENS:       no          Physical Therapy Within The Last 6 Months     Yes- 5/24   Psychotherapy     no  Massage Therapy      no    Patient Complains Of:  Uro-Fecal Incontinence no  Weight Gain/Loss  no  Fever/Chills   no  Weakness   no      PEG Assessment   What number best describes your pain on average in the past week?4  What number best describes how, during the past week, pain has interfered with your enjoyment of life?5  What number best describes how, during  the past week, pain has interfered with your general activity?  5    PHQ-9 Depression Screening  Little interest or pleasure in doing things? Several days   Feeling down, depressed, or hopeless? Several days   PHQ-2 Total Score 2   Trouble falling or staying asleep, or sleeping too much? Several days   Feeling tired or having little energy? Over half   Poor appetite or overeating? Several days   Feeling bad about yourself - or that you are a failure or have let yourself or your family down? Several days   Trouble concentrating on things, such as reading the newspaper or watching television? Not at all   Moving or speaking so slowly that other people could have noticed? Or the opposite - being so fidgety or restless that you have been moving around a lot more than usual? Not at all   Thoughts that you would be better off dead, or of hurting yourself in some way? Not at all   PHQ-9 Total Score 7   If you checked off any problems, how difficult have these problems made it for you to do your work, take care of things at home, or get along with other people? Somewhat difficult        Patient screened positive for depression based on a PHQ-9 score of 7 on 5/21/2025. Follow-up recommendations include: Suicide Risk Assessment performed.        Current Outpatient Medications:     albuterol sulfate  (90 Base) MCG/ACT inhaler, Inhale 2 puffs Every 4 (Four) Hours As Needed for Wheezing., Disp: 9 g, Rfl: 0    dicyclomine (BENTYL) 10 MG capsule, Take 1 capsule by mouth 4 (Four) Times a Day Before Meals & at Bedtime., Disp: 120 capsule, Rfl: 0    lisinopril (PRINIVIL,ZESTRIL) 20 MG tablet, Take 1 tablet by mouth Daily., Disp: 30 tablet, Rfl: 0    methocarbamol (ROBAXIN) 750 MG tablet, Take 1 tablet by mouth 3 (Three) Times a Day., Disp: , Rfl:     Omeprazole 20 MG tablet delayed-release, Take 20 mg by mouth Daily., Disp: 30 each, Rfl: 1    sertraline (ZOLOFT) 50 MG tablet, Take 1 tablet by mouth Daily., Disp: 90 tablet,  Rfl: 3    The following portions of the patient's history were reviewed and updated as appropriate: allergies, current medications, past family history, past medical history, past social history, past surgical history, and problem list.      REVIEW OF PERTINENT MEDICAL DATA    Past Medical History:   Diagnosis Date    Allergic     Asthma     Bipolar disorder 3/15/2000    Cervical disc disorder 2015    Chronic back pain     Chronic pain disorder 3/15/2015    Depression     GERD (gastroesophageal reflux disease)     Hypotension     IBS (irritable bowel syndrome)     Low back pain 2015    Prediabetes 10/19/2023    Seizures     Spinal stenosis 2024     Past Surgical History:   Procedure Laterality Date    BACK SURGERY  2025     SECTION  2012    Jeane     SECTION  10/12/2015    Kobe     SECTION  07/10/2018    Aries     SECTION  12/10/2019    Joel    DILATION AND CURETTAGE, DIAGNOSTIC / THERAPEUTIC      LUMBAR DISCECTOMY N/A 2024    Procedure: Right lumbar 4 5 microdiscectomy;  Surgeon: Primitivo Gutierrez IV, MD;  Location: [facility] MAIN OR;  Service: Neurosurgery;  Laterality: N/A;    SPINE SURGERY  2025     Family History   Adopted: Yes   Problem Relation Age of Onset    Hypertension Mother      Social History     Socioeconomic History    Marital status:    Tobacco Use    Smoking status: Every Day     Current packs/day: 0.50     Average packs/day: 0.5 packs/day for 37.2 years (18.6 ttl pk-yrs)     Types: Cigarettes     Start date: 2006    Smokeless tobacco: Never   Vaping Use    Vaping status: Some Days    Substances: Nicotine   Substance and Sexual Activity    Alcohol use: Not Currently     Alcohol/week: 5.0 standard drinks of alcohol     Types: 5 Drinks containing 0.5 oz of alcohol per week     [facility]     Drug use: Yes     Types: Marijuana    Sexual activity: Yes     Partners: Male     Birth control/protection: Tubal  ligation     Comment:           Review of Systems   Musculoskeletal:  Positive for arthralgias and back pain.         Vitals:    05/21/25 1336   BP: 148/90   Pulse: 56   Resp: 16   SpO2: 98%   Weight: 106 kg (233 lb)   PainSc: 4          Objective   Physical Exam  Musculoskeletal:         General: Tenderness present.           Imaging Reviewed:  Lumbar x-ray-3/10/2025  - Disc base narrowing at L4-5 and L5-S1  - Facet arthrosis in lower lumbar spine particularly L4-5 L5-S1.    Cervical x-ray-4/13/2024  - Unremarkable.    MRI lumbar spine without contrast-12/2/2023-before surgery  L2-3-broad disc bulge causing mild bilateral neuroforaminal stenosis  L3-4-right foraminal annular fissure with broad-based disc bulge and bilateral facet arthropathy with mild to moderate bilateral neuroforaminal stenosis and mild spinal canal stenosis  L4-5-central annular fissure, disc protrusion with disc bulge and bilateral facet arthropathy with moderate to severe spinal canal stenosis with particular effacement of right lateral recess and moderate to severe bilateral neuroforaminal stenosis - now s/p R L4-5 surgery at this level.   L5-S1-left paramedian annular fissure.  Right foraminal disc bulge with moderate to severe right and mild to moderate left neuroforaminal stenosis with effacement of right lateral recess with possible encroachment upon transversing right S1 nerve root.    Assessment:    1. Lumbar spondylosis    2. Sacroiliac joint dysfunction    3. Post laminectomy syndrome         Plan:   1.  Defer UDS for now.  2. We discussed trying a course of formal physical therapy.  Physical therapy can help strengthen and stretch the muscles around the joints. Continue to be as active as possible.  She has done physical therapy without significant improvement in pain.  3. Patient has mainly AXIAL lower back pain. Patient informed they would likely benefit from a medial branch block on bilateral from L4 to Sa.  MRI shows facet  arthirits. Facet tenderness is positive from L4 and Sa. Patient informed the procedure is both therapeutic and diagnostic in nature.  The procedure was described in detail and the risks, benefits and alternatives were discussed with the patient (including but not limited to: bleeding, infection, nerve damage, worsening of pain, CSF leak, inability to perform injection, paralysis, seizures, and death) who agreed to proceed.  Discussed RFA at 70-80 degree for  sec if patient has good relief from 2 diagnostic MBB.    4.  Believe some of her axial lower back pain below L5 vertebral level is contributed from sacroiliitis and may require SI joint injections in future.      RTC for injection and then 3 week follow up.     Eliazar Muse DO  Pain Management   [facility]         INSPECT REPORT    As part of the patient's treatment plan, I may be prescribing controlled substances. The patient has been made aware of appropriate use of such medications, including potential risk of somnolence, limited ability to drive and/or work safely, and the potential for dependence or overdose. It has also been made clear that these medications are for use by this patient only, without concomitant use of alcohol or other substances unless prescribed.     Patient has completed prescribing agreement detailing terms of continued prescribing of controlled substances, including monitoring INSPECT reports, urine drug screening, and pill counts if necessary. The patient is aware that inappropriate use will results in cessation of prescribing such medications.    INSPECT report has been reviewed and scanned into the patient's chart.      EMR Dragon/Transcription Disclaimer:   Much of this encounter note is an electronic transcription/translation of spoken language to printed text. The electronic translation of spoken language may permit erroneous, or at times, nonsensical words or phrases to be inadvertently transcribed; Although I have  reviewed the note for such errors, some may still exist.

## 2025-05-21 ENCOUNTER — TELEPHONE (OUTPATIENT)
Dept: FAMILY MEDICINE CLINIC | Facility: CLINIC | Age: 40
End: 2025-05-21
Payer: MEDICAID

## 2025-05-21 ENCOUNTER — OFFICE VISIT (OUTPATIENT)
Dept: PAIN MEDICINE | Facility: CLINIC | Age: 40
End: 2025-05-21
Payer: MEDICAID

## 2025-05-21 VITALS
HEART RATE: 56 BPM | WEIGHT: 233 LBS | RESPIRATION RATE: 16 BRPM | BODY MASS INDEX: 44.05 KG/M2 | OXYGEN SATURATION: 98 % | DIASTOLIC BLOOD PRESSURE: 90 MMHG | SYSTOLIC BLOOD PRESSURE: 148 MMHG

## 2025-05-21 DIAGNOSIS — M96.1 POST LAMINECTOMY SYNDROME: ICD-10-CM

## 2025-05-21 DIAGNOSIS — M53.3 SACROILIAC JOINT DYSFUNCTION: ICD-10-CM

## 2025-05-21 DIAGNOSIS — M47.816 LUMBAR SPONDYLOSIS: Primary | ICD-10-CM

## 2025-05-22 ENCOUNTER — TELEPHONE (OUTPATIENT)
Dept: FAMILY MEDICINE CLINIC | Facility: CLINIC | Age: 40
End: 2025-05-22

## 2025-05-22 NOTE — TELEPHONE ENCOUNTER
Caller: Carie Troy    Relationship: Self    Best call back number: [phone]    What is the best time to reach you: ANYTIME     Who are you requesting to speak with (clinical staff, provider,  specific staff member): CLINICAL     What was the call regarding: PATIENT WOULD LIKE THE ORDERS FOR HER MAMMOGRAM AND ULTRASOUND SENT TO [facility] INSTEAD OF [place].     PLEASE ADVISE

## 2025-05-27 ENCOUNTER — TELEPHONE (OUTPATIENT)
Dept: PAIN MEDICINE | Facility: CLINIC | Age: 40
End: 2025-05-27
Payer: MEDICAID

## 2025-05-27 NOTE — TELEPHONE ENCOUNTER
Caller: IBIS    Relationship: SELF    Best call back number: [phone]    What is the best time to reach you: ANY    Who are you requesting to speak with (clinical staff, provider,  specific staff member): CLINICAL    What was the call regarding: CALLING TO SEE IF SHE CAN GET SOMETHING FOR PAIN- PHARMACY IS [facility] IN [facility]- PLEASE CALL

## 2025-05-27 NOTE — PROGRESS NOTES
Chief Complaint  Gynecologic Exam    Subjective      History of Present Illness:  Carie Troy is a 39 y.o. female who presents to [facility] for Gynecologic Exam    Gynecologic Exam  The patient's pertinent negatives include no pelvic pain or vaginal discharge. The patient is experiencing no pain. Pertinent negatives include no dysuria, flank pain or hematuria. Nothing aggravates the symptoms. She has tried nothing for the symptoms. The treatment provided no relief. She is sexually active. No, her partner does not have an STD. The maximum temperature recorded prior to her arrival was no fever.       History of Present Illness  The patient is a 39-year-old female who presents for a gynecological exam. At the last office visit for her physical exam, she was on her menstrual cycle.    She reports no current concerns or changes in her health status. She is not experiencing any pain during intercourse. Her last menstrual period was approximately one month ago, and she does not believe she is pregnant. She has undergone tubal ligation.    She has brought her blood pressure log with her today, expressing concern that her readings have been elevated. She records her blood pressure an hour after taking her medication. She continues to smoke and occasionally consumes alcohol and caffeine. She is scheduled to have x-rays done later today. She is currently on lisinopril 20 mg daily, which she took this morning.    She is still taking omeprazole for acid reflux.    She is still taking Zoloft.    GYNECOLOGICAL HISTORY:  - Last Menstrual Period: 04/2025    PAST SURGICAL HISTORY:  - Tubal ligation    SOCIAL HISTORY  She admits to smoking. She uses alcohol and caffeine on and off.    Carie Troy  has a past medical history of Allergic, Asthma, Bipolar disorder (3/15/2000), Cervical disc disorder (4/14/2015), Chronic back pain, Chronic pain disorder (3/15/2015), Depression, GERD  (gastroesophageal reflux disease), Hypotension, IBS (irritable bowel syndrome), Low back pain (4/14/2015), Prediabetes (10/19/2023), Seizures, and Spinal stenosis (4/14/2024).      Allergies   Allergen Reactions    Gabapentin Other (See Comments)     Makes very tired    Desogestrel-Ethinyl Estradiol Other (See Comments)    Ibuprofen Rash    Tylenol [Acetaminophen] Rash       Social History     Socioeconomic History    Marital status:    Tobacco Use    Smoking status: Every Day     Current packs/day: 0.50     Average packs/day: 0.5 packs/day for 37.3 years (18.6 ttl pk-yrs)     Types: Cigarettes     Start date: 2/19/2006    Smokeless tobacco: Never   Vaping Use    Vaping status: Some Days    Substances: Nicotine   Substance and Sexual Activity    Alcohol use: Not Currently     Alcohol/week: 5.0 standard drinks of alcohol     Types: 5 Drinks containing 0.5 oz of alcohol per week     Comment: for hoildays     Drug use: Yes     Types: Marijuana    Sexual activity: Yes     Partners: Male     Birth control/protection: Tubal ligation     Comment:         Family History   Adopted: Yes   Problem Relation Age of Onset    Hypertension Mother        Review of Systems   Genitourinary:  Negative for dyspareunia, dysuria, flank pain, hematuria, menstrual problem, pelvic pain, pelvic pressure, urinary incontinence, vaginal bleeding, vaginal discharge and vaginal pain.           5/21/2025     1:28 PM   PHQ-2/PHQ-9 Depression Screening   Little interest or pleasure in doing things Several days   Feeling down, depressed, or hopeless Several days   Trouble falling or staying asleep, or sleeping too much Several days   Feeling tired or having little energy Over half   Poor appetite or overeating Several days   Feeling bad about yourself - or that you are a failure or have let yourself or your family down Several days   Trouble concentrating on things, such as reading the newspaper or watching television Not at all   Moving  "or speaking so slowly that other people could have noticed? Or the opposite - being so fidgety or restless that you have been moving around a lot more than usual. Not at all   Thoughts that you would be better off dead or hurting yourself in some way Not at all   Patient Health Questionnaire-9 Score 7   How difficult have these problems made it for you to do your work, take care of things at home, or get along with other people? Somewhat difficult       Fall Risk Screen:  IRAADI Fall Risk Assessment has not been completed.    Objective       Current Outpatient Medications:     albuterol sulfate  (90 Base) MCG/ACT inhaler, Inhale 2 puffs Every 4 (Four) Hours As Needed for Wheezing., Disp: 9 g, Rfl: 0    dicyclomine (BENTYL) 10 MG capsule, Take 1 capsule by mouth 4 (Four) Times a Day Before Meals & at Bedtime., Disp: 120 capsule, Rfl: 0    methocarbamol (ROBAXIN) 750 MG tablet, Take 1 tablet by mouth 3 (Three) Times a Day., Disp: , Rfl:     Omeprazole 20 MG tablet delayed-release, Take 20 mg by mouth Daily., Disp: 30 each, Rfl: 1    sertraline (ZOLOFT) 50 MG tablet, Take 1 tablet by mouth Daily., Disp: 90 tablet, Rfl: 3    lisinopril (PRINIVIL,ZESTRIL) 40 MG tablet, Take 1 tablet by mouth Daily., Disp: 90 tablet, Rfl: 3    Vitals:    05/29/25 1128   BP: 124/85   Pulse: 65   Resp: 18   Temp: 97.3 °F (36.3 °C)   TempSrc: Temporal   SpO2: 98%   Weight: 100 kg (220 lb 12.8 oz)   Height: 154.9 cm (60.98\")                  Physical Exam  Vitals and nursing note reviewed.   Constitutional:       General: She is not in acute distress.     Appearance: Normal appearance. She is well-groomed. She is not ill-appearing.   HENT:      Head: Normocephalic and atraumatic.   Eyes:      General: Lids are normal. Vision grossly intact.   Neck:      Vascular: No carotid bruit.   Cardiovascular:      Rate and Rhythm: Normal rate and regular rhythm.      Heart sounds: Normal heart sounds.   Pulmonary:      Effort: Pulmonary effort is " normal.      Breath sounds: Normal breath sounds and air entry.   Abdominal:      Hernia: There is no hernia in the left inguinal area or right inguinal area.   Genitourinary:     Exam position: Lithotomy position.      Pubic Area: No rash or pubic lice.       Labia:         Right: No rash, tenderness, lesion or injury.         Left: No rash, tenderness, lesion or injury.       Urethra: No prolapse, urethral pain, urethral swelling or urethral lesion.      Vagina: Normal. No signs of injury. No vaginal discharge, erythema, tenderness, bleeding or prolapsed vaginal walls.      Cervix: Normal. No discharge or friability.      Uterus: Normal.       Adnexa: Right adnexa normal and left adnexa normal.      Rectum: Normal.   Musculoskeletal:      Right lower leg: No edema.      Left lower leg: No edema.   Lymphadenopathy:      Lower Body: No right inguinal adenopathy. No left inguinal adenopathy.   Skin:     General: Skin is warm and dry.   Neurological:      Mental Status: She is alert and oriented to person, place, and time. Mental status is at baseline.   Psychiatric:         Mood and Affect: Mood normal.         Behavior: Behavior normal. Behavior is cooperative.       Derm Physical Exam  Physical Exam  Pelvic: Cervix is high up. Mild discharge noted.    Result Review :     The PHQ has not been completed during this encounter.       Results      Labs:       Imaging:   No valid procedures specified.        Data reviewed:             Plan      Assessment & Plan  Encounter for gynecological examination         Encounter for screening for cervical cancer    Orders:    IGP,Aptima HPV,Age Gdln    Primary hypertension  - Blood pressure readings have been consistently elevated, with today's reading at 124/85 mmHg.  - Advised to adopt a heart-healthy Mediterranean diet, increase physical activity, maintain adequate hydration, cease smoking, limit alcohol consumption, and restrict caffeine intake to no more than 1 to 2 cups of  coffee per day.  - The dosage of lisinopril will be increased from 20 mg to 40 mg daily.  - Instructed to monitor blood pressure and report any readings below 120/80 mmHg.         Tobacco dependence due to cigarettes  Tobacco use is unchanged.  Smoking cessation counseling was provided.  Tobacco use will be reassessed at the next regular appointment.           Assessment & Plan  1. Hypertension.  - Blood pressure readings have been consistently elevated, with today's reading at 124/85 mmHg.  - Advised to adopt a heart-healthy Mediterranean diet, increase physical activity, maintain adequate hydration, cease smoking, limit alcohol consumption, and restrict caffeine intake to no more than 1 to 2 cups of coffee per day.  - The dosage of lisinopril will be increased from 20 mg to 40 mg daily.  - Instructed to monitor blood pressure and report any readings below 120/80 mmHg.    2. Gastroesophageal Reflux Disease (GERD).  - Currently taking omeprazole for acid reflux.  - No changes in medication regimen discussed.  - Continues to manage symptoms with current treatment.    3. Depression.  - Currently taking Zoloft.  - No changes in medication regimen discussed.  - Continues to manage symptoms with current treatment.    4. Health Maintenance.  - Mammogram will be scheduled.  - No concerns about any spots or abnormalities noted.  - Follow-up visit scheduled in 1 month to reassess blood pressure and overall health.       Follow Up   Wrapup Tab  No follow-ups on file.     Patient was given instructions and counseling regarding her condition or for health maintenance advice. Please see specific information pulled into the AVS if appropriate.  Hand hygiene was performed during entrance to exam room and following assessment of patient. This document is intended for medical expert use only.       SHAREE Ordaz, FNP-C  [address]  [facility]  [address] [place] [address] [address]  [place]  [id number]  [phone]    Patient or patient representative verbalized consent for the use of Ambient Listening during the visit with  SHAREE Ordaz for chart documentation. 5/29/2025  18:45 EDT

## 2025-05-28 NOTE — TELEPHONE ENCOUNTER
Patient is asking for something like Hydrocodone, She states pain has gotten worse and can't even take care of her 4 months old baby (she is not breastfeeding)

## 2025-05-28 NOTE — TELEPHONE ENCOUNTER
Caller: Carie Troy    Relationship: Self    Best call back number: [phone]    What was the call regarding: PT CALLING TO GET AN UPDATE ON THIS REQUEST. PLEASE CONTACT PT WITH ANY UPDATES SO THAT PT IS AWARE IF ANY RX WAS SENT.

## 2025-05-29 ENCOUNTER — OFFICE VISIT (OUTPATIENT)
Dept: FAMILY MEDICINE CLINIC | Facility: CLINIC | Age: 40
End: 2025-05-29
Payer: MEDICAID

## 2025-05-29 VITALS
OXYGEN SATURATION: 98 % | HEIGHT: 61 IN | BODY MASS INDEX: 41.69 KG/M2 | RESPIRATION RATE: 18 BRPM | DIASTOLIC BLOOD PRESSURE: 85 MMHG | TEMPERATURE: 97.3 F | WEIGHT: 220.8 LBS | HEART RATE: 65 BPM | SYSTOLIC BLOOD PRESSURE: 124 MMHG

## 2025-05-29 DIAGNOSIS — F17.210 TOBACCO DEPENDENCE DUE TO CIGARETTES: ICD-10-CM

## 2025-05-29 DIAGNOSIS — Z01.419 ENCOUNTER FOR GYNECOLOGICAL EXAMINATION: Primary | ICD-10-CM

## 2025-05-29 DIAGNOSIS — I10 PRIMARY HYPERTENSION: ICD-10-CM

## 2025-05-29 DIAGNOSIS — Z12.4 ENCOUNTER FOR SCREENING FOR CERVICAL CANCER: ICD-10-CM

## 2025-05-29 RX ORDER — LISINOPRIL 40 MG/1
40 TABLET ORAL DAILY
Qty: 90 TABLET | Refills: 3 | Status: SHIPPED | OUTPATIENT
Start: 2025-05-29

## 2025-05-29 NOTE — ASSESSMENT & PLAN NOTE
Tobacco use is unchanged.  Smoking cessation counseling was provided.  Tobacco use will be reassessed at the next regular appointment.

## 2025-05-29 NOTE — ASSESSMENT & PLAN NOTE
- Blood pressure readings have been consistently elevated, with today's reading at 124/85 mmHg.  - Advised to adopt a heart-healthy Mediterranean diet, increase physical activity, maintain adequate hydration, cease smoking, limit alcohol consumption, and restrict caffeine intake to no more than 1 to 2 cups of coffee per day.  - The dosage of lisinopril will be increased from 20 mg to 40 mg daily.  - Instructed to monitor blood pressure and report any readings below 120/80 mmHg.

## 2025-05-30 ENCOUNTER — TELEPHONE (OUTPATIENT)
Dept: FAMILY MEDICINE CLINIC | Facility: CLINIC | Age: 40
End: 2025-05-30
Payer: MEDICAID

## 2025-05-30 ENCOUNTER — HOSPITAL ENCOUNTER (OUTPATIENT)
Dept: MAMMOGRAPHY | Facility: HOSPITAL | Age: 40
Discharge: HOME | End: 2025-05-30
Payer: MEDICAID

## 2025-05-30 NOTE — TELEPHONE ENCOUNTER
Patient said that she went to ER due to her BP dropping. 5/29/25 at 9:04p BP was 78/56 HR 57. Tried calling patient back no answer left voicemail.     Patient called back and said that she went to [facility] ED they did not change any medication. Gave her IV fluids and has been eating crackers to keep it up.

## 2025-06-02 DIAGNOSIS — K58.2 IRRITABLE BOWEL SYNDROME WITH BOTH CONSTIPATION AND DIARRHEA: ICD-10-CM

## 2025-06-02 LAB
AGE GDLN ACOG TESTING: NORMAL
CYTOLOGIST CVX/VAG CYTO: NORMAL
CYTOLOGY CVX/VAG DOC CYTO: NORMAL
CYTOLOGY CVX/VAG DOC THIN PREP: NORMAL
DX ICD CODE: NORMAL
HPV GENOTYPE REFLEX: NORMAL
HPV I/H RISK 4 DNA CVX QL PROBE+SIG AMP: NEGATIVE
OTHER STN SPEC: NORMAL
SERVICE CMNT-IMP: NORMAL
STAT OF ADQ CVX/VAG CYTO-IMP: NORMAL

## 2025-06-02 RX ORDER — DICYCLOMINE HYDROCHLORIDE 10 MG/1
10 CAPSULE ORAL
Qty: 120 CAPSULE | Refills: 0 | Status: SHIPPED | OUTPATIENT
Start: 2025-06-02

## 2025-06-03 ENCOUNTER — TELEPHONE (OUTPATIENT)
Dept: NEUROSURGERY | Facility: CLINIC | Age: 40
End: 2025-06-03
Payer: MEDICAID

## 2025-06-03 NOTE — TELEPHONE ENCOUNTER
Caller: Carie Troy     Relationship: SELF    Best call back number: [phone]     What medication are you requesting: SOMETHING FOR PAIN    What are your current symptoms: PAIN IN LUMBAR ABOVE SX SITE    How long have you been experiencing symptoms: APPROXIMATELY 2 WEEKS    Have you had these symptoms before:    [x] Yes  [] No    Have you been treated for these symptoms before:   [x] Yes  [] No    If a prescription is needed, what is your preferred pharmacy and phone number:    [facility] [id number], [place] [address]  Phone: [phone]  Fax: [phone]     Additional notes: PT SLIPPED AND SLID DOWN THE STAIRS

## 2025-06-10 ENCOUNTER — HOSPITAL ENCOUNTER (OUTPATIENT)
Dept: PAIN MEDICINE | Facility: HOSPITAL | Age: 40
Discharge: HOME | End: 2025-06-10
Payer: MEDICAID

## 2025-06-10 VITALS
HEIGHT: 61 IN | HEART RATE: 64 BPM | BODY MASS INDEX: 41.54 KG/M2 | SYSTOLIC BLOOD PRESSURE: 163 MMHG | WEIGHT: 220 LBS | DIASTOLIC BLOOD PRESSURE: 91 MMHG | OXYGEN SATURATION: 99 % | RESPIRATION RATE: 18 BRPM | TEMPERATURE: 97.1 F

## 2025-06-10 DIAGNOSIS — M47.816 LUMBAR SPONDYLOSIS: Primary | ICD-10-CM

## 2025-06-10 DIAGNOSIS — R52 PAIN: ICD-10-CM

## 2025-06-10 PROCEDURE — 25010000002 METHYLPREDNISOLONE PER 80 MG: Performed by: STUDENT IN AN ORGANIZED HEALTH CARE EDUCATION/TRAINING PROGRAM

## 2025-06-10 PROCEDURE — 25010000002 BUPIVACAINE (PF) 0.25 % SOLUTION: Performed by: STUDENT IN AN ORGANIZED HEALTH CARE EDUCATION/TRAINING PROGRAM

## 2025-06-10 PROCEDURE — 25010000002 LIDOCAINE PF 1% 1 % SOLUTION: Performed by: STUDENT IN AN ORGANIZED HEALTH CARE EDUCATION/TRAINING PROGRAM

## 2025-06-10 PROCEDURE — 77003 FLUOROGUIDE FOR SPINE INJECT: CPT

## 2025-06-10 RX ORDER — BUPIVACAINE HYDROCHLORIDE 2.5 MG/ML
10 INJECTION, SOLUTION EPIDURAL; INFILTRATION; INTRACAUDAL; PERINEURAL ONCE
Status: COMPLETED | OUTPATIENT
Start: 2025-06-10 | End: 2025-06-10

## 2025-06-10 RX ORDER — METHYLPREDNISOLONE ACETATE 80 MG/ML
80 INJECTION, SUSPENSION INTRA-ARTICULAR; INTRALESIONAL; INTRAMUSCULAR; SOFT TISSUE ONCE
Status: COMPLETED | OUTPATIENT
Start: 2025-06-10 | End: 2025-06-10

## 2025-06-10 RX ORDER — LIDOCAINE HYDROCHLORIDE 10 MG/ML
5 INJECTION, SOLUTION EPIDURAL; INFILTRATION; INTRACAUDAL; PERINEURAL ONCE
Status: COMPLETED | OUTPATIENT
Start: 2025-06-10 | End: 2025-06-10

## 2025-06-10 RX ADMIN — BUPIVACAINE HYDROCHLORIDE 10 ML: 2.5 INJECTION, SOLUTION EPIDURAL; INFILTRATION; INTRACAUDAL; PERINEURAL at 13:07

## 2025-06-10 RX ADMIN — METHYLPREDNISOLONE ACETATE 80 MG: 80 INJECTION, SUSPENSION INTRA-ARTICULAR; INTRALESIONAL; INTRAMUSCULAR; SOFT TISSUE at 13:08

## 2025-06-10 RX ADMIN — LIDOCAINE HYDROCHLORIDE 5 ML: 10 INJECTION, SOLUTION EPIDURAL; INFILTRATION; INTRACAUDAL; PERINEURAL at 13:04

## 2025-06-10 NOTE — H&P
H and P reviewed from previous visit and no changes to patient's clinical presentation. Will proceed with procedure as planned.     Eliazar Muse DO  Pain Management   [facility]

## 2025-06-10 NOTE — PROCEDURES
PROCEDURE:  Bilateral L 4, 5 and SA MBB     INDICATION: Patient complains of pain in the lower back, bilateral.  Pain increases on extension of the spine, facet tenderness present.       PROCEDURE DETAILS:   After obtaining written informed consent patient was taken to the procedure room. Pre-procedure blood pressure and pulse were stable and recorded in patients clinic chart. The patient was placed in a prone position and the lower back was prepped with chloraprep and draped in the usual sterile fashion.  The skin was infiltrated with 1% lidocaine at the junction of transverse process with the vertebral body at the left L 4 level. A 22-gauge, 3.5-inch needle was inserted into the lumbar medial branch under radiographic guidance. Both AP and lateral views were obtained.   Following placement of the needle and negative aspiration, 1.2 cc mixture of bupivacaine 0.25% with depo-medrol was injected  The identical procedure was performed on left L5 and SA medial branch was repeated on right side at L4, L5, Sa.  A total of 9 cc of 0.25% bupivacaine with 80 mg of Depo-medrol was injected. During the procedure there was no evidence of CSF, paresthesias or heme.    After the procedure the needles were removed. Skin was cleaned and a sterile dressing was applied.    Following the procedure the patient's vital signs were stable. The patient was discharged home in good condition after being given discharge instructions.    COMPLICATIONS None    Eliazar Muse DO  Pain Management   [facility]

## 2025-06-11 ENCOUNTER — TELEPHONE (OUTPATIENT)
Dept: PAIN MEDICINE | Facility: HOSPITAL | Age: 40
End: 2025-06-11
Payer: MEDICAID

## 2025-06-25 ENCOUNTER — TELEPHONE (OUTPATIENT)
Dept: NEUROSURGERY | Facility: CLINIC | Age: 40
End: 2025-06-25

## 2025-06-25 NOTE — TELEPHONE ENCOUNTER
CALLED PATIENT TO RESCHEDULE HER APPOINTMENT TODAY AND SHE STATED THAT SHE WILL NEED TO CALL BACK LATER TODAY TO DO THAT. I DID TELL HER THAT I HAVE CANCELED HER APPOINTMENT FOR TODAY DUE TO THE PROVIDER IS OUT SICK. SHE VERBALLY UNDERSTOOD.

## 2025-07-08 NOTE — PROGRESS NOTES
Subjective   Carie Troy is a 39 y.o. female is here for follow up for lower back pain. Patient was last seen for bilateral MBB L4-SA. She states that she took pain pills from her friends and that helped her with lower back pain.     On last visit:     Lower back pain is 5/10 on VAS, at maximum is 10/10. Pain is aching, dull, sharp, and stabbing in nature. Pain is referred to b/l buttocks. The pain is constant. The pain is improved by narcotics. The pain is worse with increased activities.  Her main complaint is axial lower back pain.    Previous Injection:   6/9/2025-B/L MBB L4-SA- very short term pain relief.     Hx: Referred by Thee Akbar PA  to our pain management clinic for consultation, evaluation and treatment of lower back pain and post laminectomy syndrome. Initial pain started in 2012 after a water slide accident. Chronic history of lower back pain with worsening of pain for which she underwent right L4-5 microdiscectomy with Dr. Gutierrez on 4/5/2024. Significantly improved radicular pain after surgery but continues to have lower back pain. LBP started worsening throughout her recent pregnancy and somewhat stabilized after giving birth in January.  No radicular symptoms or weakness.  She has tried Norco, Flexeril, PT in the past.  There was concern regarding possibility of sacroiliitis versus lumbar spondylosis increasing her axial lower back pain and sent to us for further treatment.          PHQ-9- 7                       SOAPP- 7  [place] back disability scale - 20     PMH:   Hypertension, Chiari malformation type I, depression, right L4-5 microdiscectomy-4/5/2024     Current Medications:   Robaxin-750 mg   Zoloft        Past Medications:  Meloxicam  Has tried multiple antiinflammatory medications  Gabapentin - makes her sleepy.      Past Modalities:  TENS:                                                                          no                                                  Physical  Therapy Within The Last 6 Months              Yes- 5/24   Psychotherapy                                                            no  Massage Therapy                                                       no     Patient Complains Of:  Uro-Fecal Incontinence          no  Weight Gain/Loss                   no  Fever/Chills                             no  Weakness                               no        PEG Assessment   What number best describes your pain on average in the past week?4  What number best describes how, during the past week, pain has interfered with your enjoyment of life?5  What number best describes how, during the past week, pain has interfered with your general activity?  5         Current Outpatient Medications:     albuterol sulfate  (90 Base) MCG/ACT inhaler, Inhale 2 puffs Every 4 (Four) Hours As Needed for Wheezing., Disp: 9 g, Rfl: 0    dicyclomine (BENTYL) 10 MG capsule, TAKE 1 CAPSULE BY MOUTH 4 TIMES DAILY BEFORE MEAL(S) AND AT BEDTIME, Disp: 120 capsule, Rfl: 0    lisinopril (PRINIVIL,ZESTRIL) 40 MG tablet, Take 1 tablet by mouth Daily., Disp: 90 tablet, Rfl: 3    methocarbamol (ROBAXIN) 750 MG tablet, Take 1 tablet by mouth 3 (Three) Times a Day., Disp: , Rfl:     Omeprazole 20 MG tablet delayed-release, Take 20 mg by mouth Daily., Disp: 30 each, Rfl: 1    sertraline (ZOLOFT) 50 MG tablet, Take 1 tablet by mouth Daily., Disp: 90 tablet, Rfl: 3    The following portions of the patient's history were reviewed and updated as appropriate: allergies, current medications, past family history, past medical history, past social history, past surgical history, and problem list.      REVIEW OF PERTINENT MEDICAL DATA    Past Medical History:   Diagnosis Date    Allergic     Asthma     Bipolar disorder 3/15/2000    Cervical disc disorder 4/14/2015    Chronic back pain     Chronic pain disorder 3/15/2015    Depression     GERD (gastroesophageal reflux disease)     Hypotension     IBS (irritable  bowel syndrome)     Low back pain 2015    Prediabetes 10/19/2023    Seizures     Spinal stenosis 2024     Past Surgical History:   Procedure Laterality Date    BACK SURGERY  2025     SECTION  2012    Jeane     SECTION  10/12/2015    Kobe     SECTION  07/10/2018    Aries     SECTION  12/10/2019    Joel    DILATION AND CURETTAGE, DIAGNOSTIC / THERAPEUTIC  2007    LUMBAR DISCECTOMY N/A 2024    Procedure: Right lumbar 4 5 microdiscectomy;  Surgeon: Primitivo Gutierrez IV, MD;  Location: [facility] MAIN OR;  Service: Neurosurgery;  Laterality: N/A;    SPINE SURGERY  2025     Family History   Adopted: Yes   Problem Relation Age of Onset    Hypertension Mother      Social History     Socioeconomic History    Marital status:    Tobacco Use    Smoking status: Every Day     Current packs/day: 0.50     Average packs/day: 0.5 packs/day for 37.4 years (18.7 ttl pk-yrs)     Types: Cigarettes     Start date: 2006    Smokeless tobacco: Never   Vaping Use    Vaping status: Some Days    Substances: Nicotine   Substance and Sexual Activity    Alcohol use: Not Currently     Alcohol/week: 5.0 standard drinks of alcohol     Types: 5 Drinks containing 0.5 oz of alcohol per week     Comment: for hoildays     Drug use: Yes     Types: Marijuana    Sexual activity: Yes     Partners: Male     Birth control/protection: Tubal ligation     Comment:           Review of Systems   Musculoskeletal:  Positive for arthralgias and back pain.         Vitals:    25 1326   BP: 144/89   BP Location: Right arm   Patient Position: Sitting   Cuff Size: Adult   Pulse: 75   Resp: 16   SpO2: 98%   Weight: 102 kg (225 lb)   PainSc: 4          Objective   Physical Exam  Musculoskeletal:         General: Tenderness present.        Back:            Imaging Reviewed:  Lumbar x-ray-3/10/2025  - Disc base narrowing at L4-5 and L5-S1  - Facet arthrosis in lower lumbar spine particularly  L4-5 L5-S1.     Cervical x-ray-4/13/2024  - Unremarkable.     MRI lumbar spine without contrast-12/2/2023-before surgery  L2-3-broad disc bulge causing mild bilateral neuroforaminal stenosis  L3-4-right foraminal annular fissure with broad-based disc bulge and bilateral facet arthropathy with mild to moderate bilateral neuroforaminal stenosis and mild spinal canal stenosis  L4-5-central annular fissure, disc protrusion with disc bulge and bilateral facet arthropathy with moderate to severe spinal canal stenosis with particular effacement of right lateral recess and moderate to severe bilateral neuroforaminal stenosis - now s/p R L4-5 microdiscectomy surgery at this level.   L5-S1-left paramedian annular fissure.  Right foraminal disc bulge with moderate to severe right and mild to moderate left neuroforaminal stenosis with effacement of right lateral recess with possible encroachment upon transversing right S1 nerve root.       Assessment:    1. Lumbar radiculopathy    2. Sacroiliac joint dysfunction    3. Post laminectomy syndrome    4. Lumbar spondylosis         Plan:   1.  Defer UDS for now.  2. We discussed trying a course of formal physical therapy.  Physical therapy can help strengthen and stretch the muscles around the joints. Continue to be as active as possible.  She has done physical therapy without significant improvement in pain.  3. Unfortunately no significant pain relief with MBB. Patient has pain in the lower back with referred pain in the leg, patient has failed conservative therapy including PT and pharmacological management for more than 6 weeks and pain interferes with activities of daily living. MRI shows foraminal narrowing at multiple levels. Discussed lumbar NILTON L5-S1. Discussed the possibility of infection, bleeding, nerve damage, post dural puncture headache, increased pain, paraplegia. Patient understands and agrees.   4.  Believe some of her axial lower back pain below L5 vertebral level  is contributed from sacroiliitis and may require SI joint injections in future.  5. Discussed possibility of Lyrica, but she would like to hold off due to possibility of drowsiness with having small child.   6. Advised patient to not take pain meds not prescribed to her.        RTC for injection and then 3 week follow up.      Eliazar Muse DO  Pain Management   [facility]          INSPECT REPORT    As part of the patient's treatment plan, I may be prescribing controlled substances. The patient has been made aware of appropriate use of such medications, including potential risk of somnolence, limited ability to drive and/or work safely, and the potential for dependence or overdose. It has also been made clear that these medications are for use by this patient only, without concomitant use of alcohol or other substances unless prescribed.     Patient has completed prescribing agreement detailing terms of continued prescribing of controlled substances, including monitoring INSPECT reports, urine drug screening, and pill counts if necessary. The patient is aware that inappropriate use will results in cessation of prescribing such medications.    INSPECT report has been reviewed and scanned into the patient's chart.

## 2025-07-09 ENCOUNTER — OFFICE VISIT (OUTPATIENT)
Dept: PAIN MEDICINE | Facility: CLINIC | Age: 40
End: 2025-07-09
Payer: MEDICAID

## 2025-07-09 VITALS
WEIGHT: 225 LBS | BODY MASS INDEX: 42.54 KG/M2 | RESPIRATION RATE: 16 BRPM | OXYGEN SATURATION: 98 % | HEART RATE: 75 BPM | DIASTOLIC BLOOD PRESSURE: 89 MMHG | SYSTOLIC BLOOD PRESSURE: 144 MMHG

## 2025-07-09 DIAGNOSIS — M54.16 LUMBAR RADICULOPATHY: Primary | ICD-10-CM

## 2025-07-09 DIAGNOSIS — M53.3 SACROILIAC JOINT DYSFUNCTION: ICD-10-CM

## 2025-07-09 DIAGNOSIS — M47.816 LUMBAR SPONDYLOSIS: ICD-10-CM

## 2025-07-09 DIAGNOSIS — M96.1 POST LAMINECTOMY SYNDROME: ICD-10-CM

## 2025-07-13 DIAGNOSIS — K58.2 IRRITABLE BOWEL SYNDROME WITH BOTH CONSTIPATION AND DIARRHEA: ICD-10-CM

## 2025-07-15 RX ORDER — DICYCLOMINE HYDROCHLORIDE 10 MG/1
10 CAPSULE ORAL
Qty: 120 CAPSULE | Refills: 0 | OUTPATIENT
Start: 2025-07-15

## 2025-07-25 ENCOUNTER — TELEPHONE (OUTPATIENT)
Dept: FAMILY MEDICINE CLINIC | Facility: CLINIC | Age: 40
End: 2025-07-25
Payer: MEDICAID

## 2025-07-25 NOTE — TELEPHONE ENCOUNTER
Pt seen at [facility] ER.  Note printed and placed upfront to be scanned in chart.

## 2025-07-25 NOTE — TELEPHONE ENCOUNTER
Yes, the patient is to go to the ER or urgent care.  There is no way to work that up without someone seeing

## 2025-07-25 NOTE — TELEPHONE ENCOUNTER
Caller: Carie Troy     Relationship: [unfilled]     Best call back number: [phone]     What is your medical concern? PATIENT CALLING STATING THAT SHE IS HAVING BREAST PAIN THAT GOES INTO HER SHOULDER. SHE WOULD LIKE TO KNOW WHAT THIS COULD BE.    How long has this issue been going on? PATIENT STARTED TODAY     Is your provider already aware of this issue?     Have you been treated for this issue?

## 2025-07-29 ENCOUNTER — HOSPITAL ENCOUNTER (OUTPATIENT)
Dept: PAIN MEDICINE | Facility: HOSPITAL | Age: 40
Discharge: HOME | End: 2025-07-29
Payer: MEDICAID

## 2025-07-29 VITALS
HEART RATE: 81 BPM | OXYGEN SATURATION: 97 % | TEMPERATURE: 97.3 F | HEIGHT: 61 IN | WEIGHT: 225 LBS | DIASTOLIC BLOOD PRESSURE: 80 MMHG | BODY MASS INDEX: 42.48 KG/M2 | RESPIRATION RATE: 16 BRPM | SYSTOLIC BLOOD PRESSURE: 135 MMHG

## 2025-07-29 DIAGNOSIS — M54.16 LUMBAR RADICULOPATHY: Primary | ICD-10-CM

## 2025-07-29 DIAGNOSIS — R52 PAIN: ICD-10-CM

## 2025-07-29 PROCEDURE — 62323 NJX INTERLAMINAR LMBR/SAC: CPT | Performed by: STUDENT IN AN ORGANIZED HEALTH CARE EDUCATION/TRAINING PROGRAM

## 2025-07-29 PROCEDURE — 25010000002 METHYLPREDNISOLONE PER 80 MG: Performed by: STUDENT IN AN ORGANIZED HEALTH CARE EDUCATION/TRAINING PROGRAM

## 2025-07-29 PROCEDURE — 25510000001 IOPAMIDOL 41 % SOLUTION: Performed by: STUDENT IN AN ORGANIZED HEALTH CARE EDUCATION/TRAINING PROGRAM

## 2025-07-29 PROCEDURE — 77003 FLUOROGUIDE FOR SPINE INJECT: CPT

## 2025-07-29 RX ORDER — IOPAMIDOL 408 MG/ML
3 INJECTION, SOLUTION INTRATHECAL
Status: COMPLETED | OUTPATIENT
Start: 2025-07-29 | End: 2025-07-29

## 2025-07-29 RX ORDER — METHYLPREDNISOLONE ACETATE 80 MG/ML
80 INJECTION, SUSPENSION INTRA-ARTICULAR; INTRALESIONAL; INTRAMUSCULAR; SOFT TISSUE ONCE
Status: COMPLETED | OUTPATIENT
Start: 2025-07-29 | End: 2025-07-29

## 2025-07-29 RX ADMIN — IOPAMIDOL 3 ML: 408 INJECTION, SOLUTION INTRATHECAL at 15:20

## 2025-07-29 RX ADMIN — METHYLPREDNISOLONE ACETATE 80 MG: 80 INJECTION, SUSPENSION INTRA-ARTICULAR; INTRALESIONAL; INTRAMUSCULAR; SOFT TISSUE at 15:20

## 2025-07-29 NOTE — DISCHARGE INSTRUCTIONS

## 2025-07-29 NOTE — PROCEDURES
PREOPERATIVE DIAGNOSIS:    1. Lumbar radiculopathy     POSTOPERATIVE DIAGNOSIS: Same    PROCEDURE:  Lumbar epidural steroid injection L5-S1    PROCEDURE NOTE:  After obtaining written informed consent patient was taken to the procedure room. Pre-procedure blood pressure and pulse were stable and recorded in patients clinic chart.     The patient was placed in the prone position. The lower back was prepped with antiseptic solution and draped in the usual sterile fashion.  The skin over the L5-S1 space was identified under fluoroscopic guidance and infiltrated with 1% lidocaine for local anesthesia via 25 gauge needle.  A 20-gauge tuohy needle was used to access the epidural space using loss of resistance to air technique. Following negative aspiration, 2 cc of the omnipaque dye was injected.  There was good spread of the dye from L3-L5 area. A mixture containing  3 ml of saline with 80 mg of depo-medrol was injected. There was no evidence of CSF, paresthesia or vascular spread. The needle was removed. Skin was cleaned and band aid was applied.    Following the procedure the patient's vital signs were stable. The patient was discharged home in good condition after being given discharge instructions.    COMPLICATIONS: None    Elaizar Muse DO  Pain Management   [facility]

## 2025-07-29 NOTE — ADDENDUM NOTE
Encounter addended by: Marlin Velasquez RN on: 7/29/2025 3:51 PM   Actions taken: Flowsheet accepted

## 2025-07-30 ENCOUNTER — TELEPHONE (OUTPATIENT)
Dept: PAIN MEDICINE | Facility: HOSPITAL | Age: 40
End: 2025-07-30
Payer: MEDICAID

## (undated) DEVICE — DRP OPMI 326071

## (undated) DEVICE — DRP C/ARMOR

## (undated) DEVICE — PK BASIC SPINE 50

## (undated) DEVICE — DRP MICROSCP SURG SMARTDRAPE VISIONGUARD

## (undated) DEVICE — SPONGE,NEURO,1"X1",XR,STRL,LF,10/PK: Brand: MEDLINE

## (undated) DEVICE — KNIF BAYO 188MM

## (undated) DEVICE — KENDALL SCD EXPRESS FOOT CUFF, MEDIUM: Brand: KENDALL SCD

## (undated) DEVICE — ELECTRD BLD EZ CLN MOD 6.5IN

## (undated) DEVICE — PRT MIN/ACC MARS PEEK STR 22X60MM 1P/U

## (undated) DEVICE — ANTIBACTERIAL UNDYED BRAIDED (POLYGLACTIN 910), SYNTHETIC ABSORBABLE SUTURE: Brand: COATED VICRYL

## (undated) DEVICE — TRANSFER SET 3": Brand: MEDLINE INDUSTRIES, INC.

## (undated) DEVICE — GLV SURG BIOGEL LTX PF 8

## (undated) DEVICE — EXOFIN PRECISION PEN HIGH VISCOSITY TOPICAL SKIN ADHESIVE: Brand: EXOFIN PRECISION PEN, 1G

## (undated) DEVICE — TUBING, SUCTION, 1/4" X 12', STRAIGHT: Brand: MEDLINE

## (undated) DEVICE — SYR LUERLOK 30CC

## (undated) DEVICE — SHEET, DRAPE, SPLIT, STERILE: Brand: MEDLINE

## (undated) DEVICE — UNDERGLV SURG BIOGEL/PI PF SYNTH SURG SZ8.5 BLU 50/BX

## (undated) DEVICE — 3.0MM PRECISION NEURO (MATCH HEAD)

## (undated) DEVICE — DRAPE,TOWEL,LARGE,INVISISHIELD: Brand: MEDLINE

## (undated) DEVICE — KT SPINE SURG TOP W/PRONEVIEW

## (undated) DEVICE — SOLUTION,WATER,IRRIGATION,1000ML,STERILE: Brand: MEDLINE

## (undated) DEVICE — SMOKE EVACUATION TUBING WITH 7/8 IN TO 1/4 IN REDUCER: Brand: BUFFALO FILTER

## (undated) DEVICE — KT SURG TURNOVER 050

## (undated) DEVICE — DECANTER: Brand: UNBRANDED

## (undated) DEVICE — DRSNG WND BORDR/ADHS NONADHR/GZ LF 4X4IN STRL